# Patient Record
Sex: FEMALE | Race: WHITE
[De-identification: names, ages, dates, MRNs, and addresses within clinical notes are randomized per-mention and may not be internally consistent; named-entity substitution may affect disease eponyms.]

---

## 2017-06-27 ENCOUNTER — APPOINTMENT (OUTPATIENT)
Dept: PEDIATRIC ADOLESCENT MEDICINE | Facility: HOSPITAL | Age: 22
End: 2017-06-27

## 2017-08-04 ENCOUNTER — OUTPATIENT (OUTPATIENT)
Dept: OUTPATIENT SERVICES | Age: 22
LOS: 1 days | End: 2017-08-04

## 2017-08-04 ENCOUNTER — APPOINTMENT (OUTPATIENT)
Dept: PEDIATRIC ADOLESCENT MEDICINE | Facility: CLINIC | Age: 22
End: 2017-08-04
Payer: MEDICAID

## 2017-08-04 VITALS
HEART RATE: 75 BPM | BODY MASS INDEX: 25.87 KG/M2 | HEIGHT: 62.99 IN | DIASTOLIC BLOOD PRESSURE: 65 MMHG | WEIGHT: 146 LBS | TEMPERATURE: 97.5 F | SYSTOLIC BLOOD PRESSURE: 118 MMHG

## 2017-08-04 DIAGNOSIS — J30.1 ALLERGIC RHINITIS DUE TO POLLEN: ICD-10-CM

## 2017-08-04 DIAGNOSIS — Z00.00 ENCOUNTER FOR GENERAL ADULT MEDICAL EXAMINATION W/OUT ABNORMAL FINDINGS: ICD-10-CM

## 2017-08-04 DIAGNOSIS — Z30.011 ENCOUNTER FOR INITIAL PRESCRIPTION OF CONTRACEPTIVE PILLS: ICD-10-CM

## 2017-08-04 DIAGNOSIS — J34.2 DEVIATED NASAL SEPTUM: ICD-10-CM

## 2017-08-04 DIAGNOSIS — N83.201 UNSPECIFIED OVARIAN CYST, RIGHT SIDE: ICD-10-CM

## 2017-08-04 DIAGNOSIS — Z11.3 ENCOUNTER FOR SCREENING FOR INFECTIONS WITH A PREDOMINANTLY SEXUAL MODE OF TRANSMISSION: ICD-10-CM

## 2017-08-04 DIAGNOSIS — G89.29 PAIN IN LEFT KNEE: ICD-10-CM

## 2017-08-04 DIAGNOSIS — N83.202 UNSPECIFIED OVARIAN CYST, RIGHT SIDE: ICD-10-CM

## 2017-08-04 DIAGNOSIS — Z90.49 ACQUIRED ABSENCE OF OTHER SPECIFIED PARTS OF DIGESTIVE TRACT: Chronic | ICD-10-CM

## 2017-08-04 DIAGNOSIS — M25.562 PAIN IN LEFT KNEE: ICD-10-CM

## 2017-08-04 PROCEDURE — 99395 PREV VISIT EST AGE 18-39: CPT

## 2017-08-04 RX ORDER — CHOLECALCIFEROL (VITAMIN D3) 1250 MCG
1.25 MG CAPSULE ORAL
Qty: 4 | Refills: 0 | Status: ACTIVE | COMMUNITY
Start: 2017-07-07

## 2017-08-04 RX ORDER — METHYLPREDNISOLONE 4 MG/1
4 TABLET ORAL
Qty: 21 | Refills: 0 | Status: DISCONTINUED | COMMUNITY
Start: 2017-04-15

## 2017-08-04 RX ORDER — METOCLOPRAMIDE 5 MG/1
5 TABLET ORAL
Qty: 15 | Refills: 0 | Status: DISCONTINUED | COMMUNITY
Start: 2017-03-29

## 2017-08-04 RX ORDER — NORGESTIMATE AND ETHINYL ESTRADIOL 7DAYSX3 LO
0.18/0.215/0.25 KIT ORAL
Qty: 28 | Refills: 0 | Status: DISCONTINUED | COMMUNITY
Start: 2017-06-21

## 2017-08-04 RX ORDER — FLUCONAZOLE 150 MG/1
150 TABLET ORAL
Qty: 1 | Refills: 0 | Status: DISCONTINUED | COMMUNITY
Start: 2017-06-14

## 2017-08-04 RX ORDER — MELOXICAM 15 MG/1
15 TABLET ORAL
Qty: 30 | Refills: 0 | Status: DISCONTINUED | COMMUNITY
Start: 2017-04-13

## 2017-08-04 RX ORDER — AZITHROMYCIN 500 MG/1
500 TABLET, FILM COATED ORAL
Qty: 2 | Refills: 0 | Status: DISCONTINUED | COMMUNITY
Start: 2017-07-07

## 2017-08-04 RX ORDER — CIPROFLOXACIN HYDROCHLORIDE 250 MG/1
250 TABLET, FILM COATED ORAL
Qty: 10 | Refills: 0 | Status: DISCONTINUED | COMMUNITY
Start: 2017-06-21

## 2017-08-08 DIAGNOSIS — G43.009 MIGRAINE WITHOUT AURA, NOT INTRACTABLE, WITHOUT STATUS MIGRAINOSUS: ICD-10-CM

## 2017-08-08 DIAGNOSIS — Z00.00 ENCOUNTER FOR GENERAL ADULT MEDICAL EXAMINATION WITHOUT ABNORMAL FINDINGS: ICD-10-CM

## 2017-08-08 DIAGNOSIS — J30.1 ALLERGIC RHINITIS DUE TO POLLEN: ICD-10-CM

## 2017-08-08 DIAGNOSIS — Z30.011 ENCOUNTER FOR INITIAL PRESCRIPTION OF CONTRACEPTIVE PILLS: ICD-10-CM

## 2017-08-08 DIAGNOSIS — G89.29 OTHER CHRONIC PAIN: ICD-10-CM

## 2017-08-08 DIAGNOSIS — M25.562 PAIN IN LEFT KNEE: ICD-10-CM

## 2017-08-08 DIAGNOSIS — Z11.3 ENCOUNTER FOR SCREENING FOR INFECTIONS WITH A PREDOMINANTLY SEXUAL MODE OF TRANSMISSION: ICD-10-CM

## 2017-08-08 DIAGNOSIS — J34.2 DEVIATED NASAL SEPTUM: ICD-10-CM

## 2017-08-15 LAB
BASOPHILS # BLD AUTO: 0.01 K/UL
BASOPHILS NFR BLD AUTO: 0.1 %
C TRACH RRNA SPEC QL NAA+PROBE: NORMAL
EOSINOPHIL # BLD AUTO: 0.35 K/UL
EOSINOPHIL NFR BLD AUTO: 5 %
HCT VFR BLD CALC: 42.5 %
HGB BLD-MCNC: 14.4 G/DL
IMM GRANULOCYTES NFR BLD AUTO: 0.1 %
IRON SATN MFR SERPL: 14 %
IRON SERPL-MCNC: 39 UG/DL
LYMPHOCYTES # BLD AUTO: 2.24 K/UL
LYMPHOCYTES NFR BLD AUTO: 31.9 %
MAN DIFF?: NORMAL
MCHC RBC-ENTMCNC: 27.1 PG
MCHC RBC-ENTMCNC: 33.9 GM/DL
MCV RBC AUTO: 79.9 FL
MONOCYTES # BLD AUTO: 0.53 K/UL
MONOCYTES NFR BLD AUTO: 7.5 %
N GONORRHOEA RRNA SPEC QL NAA+PROBE: NORMAL
NEUTROPHILS # BLD AUTO: 3.88 K/UL
NEUTROPHILS NFR BLD AUTO: 55.4 %
PLATELET # BLD AUTO: 232 K/UL
RBC # BLD: 5.32 M/UL
RBC # FLD: 12.9 %
SOURCE AMPLIFICATION: NORMAL
TIBC SERPL-MCNC: 287 UG/DL
UIBC SERPL-MCNC: 248 UG/DL
WBC # FLD AUTO: 7.02 K/UL

## 2017-08-24 ENCOUNTER — APPOINTMENT (OUTPATIENT)
Dept: PEDIATRIC ADOLESCENT MEDICINE | Facility: HOSPITAL | Age: 22
End: 2017-08-24

## 2017-12-22 ENCOUNTER — APPOINTMENT (OUTPATIENT)
Dept: PEDIATRIC ADOLESCENT MEDICINE | Facility: HOSPITAL | Age: 22
End: 2017-12-22
Payer: MEDICAID

## 2017-12-22 ENCOUNTER — OUTPATIENT (OUTPATIENT)
Dept: OUTPATIENT SERVICES | Age: 22
LOS: 1 days | End: 2017-12-22

## 2017-12-22 VITALS — WEIGHT: 146 LBS | SYSTOLIC BLOOD PRESSURE: 101 MMHG | HEART RATE: 68 BPM | DIASTOLIC BLOOD PRESSURE: 74 MMHG

## 2017-12-22 DIAGNOSIS — Z90.49 ACQUIRED ABSENCE OF OTHER SPECIFIED PARTS OF DIGESTIVE TRACT: Chronic | ICD-10-CM

## 2017-12-22 DIAGNOSIS — B35.1 TINEA UNGUIUM: ICD-10-CM

## 2017-12-22 DIAGNOSIS — E55.9 VITAMIN D DEFICIENCY, UNSPECIFIED: ICD-10-CM

## 2017-12-22 DIAGNOSIS — M25.512 PAIN IN LEFT SHOULDER: ICD-10-CM

## 2017-12-22 DIAGNOSIS — M25.562 PAIN IN LEFT KNEE: ICD-10-CM

## 2017-12-22 DIAGNOSIS — J45.31 MILD PERSISTENT ASTHMA WITH (ACUTE) EXACERBATION: ICD-10-CM

## 2017-12-22 PROCEDURE — 99214 OFFICE O/P EST MOD 30 MIN: CPT

## 2017-12-22 RX ORDER — AMOXICILLIN 875 MG/1
875 TABLET, FILM COATED ORAL
Qty: 20 | Refills: 0 | Status: DISCONTINUED | COMMUNITY
Start: 2017-10-21

## 2017-12-22 RX ORDER — FLUTICASONE PROPIONATE 50 UG/1
50 SPRAY, METERED NASAL DAILY
Qty: 1 | Refills: 0 | Status: DISCONTINUED | COMMUNITY
Start: 2017-08-04 | End: 2017-12-22

## 2017-12-22 RX ORDER — NORGESTIMATE AND ETHINYL ESTRADIOL 7DAYSX3 28
0.18/0.215/0.25 KIT ORAL
Qty: 1 | Refills: 0 | Status: DISCONTINUED | COMMUNITY
Start: 2017-08-04 | End: 2017-08-22

## 2017-12-22 RX ORDER — AMOXICILLIN AND CLAVULANATE POTASSIUM 875; 125 MG/1; MG/1
875-125 TABLET, COATED ORAL
Qty: 20 | Refills: 0 | Status: COMPLETED | COMMUNITY
Start: 2017-11-22

## 2017-12-28 LAB — 25(OH)D3 SERPL-MCNC: 27.5 NG/ML

## 2017-12-29 ENCOUNTER — APPOINTMENT (OUTPATIENT)
Dept: DERMATOLOGY | Facility: CLINIC | Age: 22
End: 2017-12-29
Payer: MEDICAID

## 2017-12-29 VITALS
DIASTOLIC BLOOD PRESSURE: 76 MMHG | HEIGHT: 63 IN | SYSTOLIC BLOOD PRESSURE: 104 MMHG | BODY MASS INDEX: 25.69 KG/M2 | WEIGHT: 145 LBS

## 2017-12-29 DIAGNOSIS — Z91.89 OTHER SPECIFIED PERSONAL RISK FACTORS, NOT ELSEWHERE CLASSIFIED: ICD-10-CM

## 2017-12-29 DIAGNOSIS — L85.8 OTHER SPECIFIED EPIDERMAL THICKENING: ICD-10-CM

## 2017-12-29 DIAGNOSIS — L60.8 OTHER NAIL DISORDERS: ICD-10-CM

## 2017-12-29 DIAGNOSIS — Z84.0 FAMILY HISTORY OF DISEASES OF THE SKIN AND SUBCUTANEOUS TISSUE: ICD-10-CM

## 2017-12-29 PROCEDURE — 99202 OFFICE O/P NEW SF 15 MIN: CPT

## 2017-12-29 RX ORDER — AMMONIUM LACTATE 12 %
12 CREAM (GRAM) TOPICAL
Qty: 1 | Refills: 11 | Status: ACTIVE | COMMUNITY
Start: 2017-12-29 | End: 1900-01-01

## 2018-01-02 ENCOUNTER — MEDICATION RENEWAL (OUTPATIENT)
Age: 23
End: 2018-01-02

## 2018-01-03 DIAGNOSIS — E55.9 VITAMIN D DEFICIENCY, UNSPECIFIED: ICD-10-CM

## 2018-01-03 DIAGNOSIS — B35.1 TINEA UNGUIUM: ICD-10-CM

## 2018-01-03 DIAGNOSIS — R51 HEADACHE: ICD-10-CM

## 2018-01-03 DIAGNOSIS — J45.31 MILD PERSISTENT ASTHMA WITH (ACUTE) EXACERBATION: ICD-10-CM

## 2018-01-03 DIAGNOSIS — M25.512 PAIN IN LEFT SHOULDER: ICD-10-CM

## 2018-01-03 DIAGNOSIS — M25.562 PAIN IN LEFT KNEE: ICD-10-CM

## 2018-08-01 ENCOUNTER — OUTPATIENT (OUTPATIENT)
Dept: OUTPATIENT SERVICES | Facility: HOSPITAL | Age: 23
LOS: 1 days | End: 2018-08-01
Payer: MEDICAID

## 2018-08-01 DIAGNOSIS — Z90.49 ACQUIRED ABSENCE OF OTHER SPECIFIED PARTS OF DIGESTIVE TRACT: Chronic | ICD-10-CM

## 2018-08-01 PROCEDURE — G9001: CPT

## 2018-08-06 ENCOUNTER — INPATIENT (INPATIENT)
Facility: HOSPITAL | Age: 23
LOS: 1 days | Discharge: HOME | DRG: 72 | End: 2018-08-08
Attending: PSYCHIATRY & NEUROLOGY | Admitting: PSYCHIATRY & NEUROLOGY
Payer: MEDICAID

## 2018-08-06 VITALS
OXYGEN SATURATION: 99 % | SYSTOLIC BLOOD PRESSURE: 132 MMHG | TEMPERATURE: 99 F | DIASTOLIC BLOOD PRESSURE: 96 MMHG | RESPIRATION RATE: 20 BRPM | HEART RATE: 94 BPM

## 2018-08-06 DIAGNOSIS — I63.9 CEREBRAL INFARCTION, UNSPECIFIED: ICD-10-CM

## 2018-08-06 DIAGNOSIS — Z90.49 ACQUIRED ABSENCE OF OTHER SPECIFIED PARTS OF DIGESTIVE TRACT: Chronic | ICD-10-CM

## 2018-08-06 LAB
ALBUMIN SERPL ELPH-MCNC: 4.4 G/DL — SIGNIFICANT CHANGE UP (ref 3.3–5)
ALP SERPL-CCNC: 78 U/L — SIGNIFICANT CHANGE UP (ref 40–120)
ALT FLD-CCNC: 12 U/L — SIGNIFICANT CHANGE UP (ref 10–45)
ANION GAP SERPL CALC-SCNC: 11 MMOL/L — SIGNIFICANT CHANGE UP (ref 5–17)
APTT BLD: 29.3 SEC — SIGNIFICANT CHANGE UP (ref 27.5–37.4)
AST SERPL-CCNC: 17 U/L — SIGNIFICANT CHANGE UP (ref 10–40)
BASOPHILS # BLD AUTO: 0 K/UL — SIGNIFICANT CHANGE UP (ref 0–0.2)
BASOPHILS NFR BLD AUTO: 0.2 % — SIGNIFICANT CHANGE UP (ref 0–2)
BILIRUB SERPL-MCNC: 0.6 MG/DL — SIGNIFICANT CHANGE UP (ref 0.2–1.2)
BUN SERPL-MCNC: 10 MG/DL — SIGNIFICANT CHANGE UP (ref 7–23)
CALCIUM SERPL-MCNC: 8.9 MG/DL — SIGNIFICANT CHANGE UP (ref 8.4–10.5)
CHLORIDE SERPL-SCNC: 106 MMOL/L — SIGNIFICANT CHANGE UP (ref 96–108)
CO2 SERPL-SCNC: 24 MMOL/L — SIGNIFICANT CHANGE UP (ref 22–31)
CREAT SERPL-MCNC: 1.01 MG/DL — SIGNIFICANT CHANGE UP (ref 0.5–1.3)
EOSINOPHIL # BLD AUTO: 0.3 K/UL — SIGNIFICANT CHANGE UP (ref 0–0.5)
EOSINOPHIL NFR BLD AUTO: 3.5 % — SIGNIFICANT CHANGE UP (ref 0–6)
GLUCOSE SERPL-MCNC: 128 MG/DL — HIGH (ref 70–99)
HCT VFR BLD CALC: 42 % — SIGNIFICANT CHANGE UP (ref 34.5–45)
HGB BLD-MCNC: 14.3 G/DL — SIGNIFICANT CHANGE UP (ref 11.5–15.5)
INR BLD: 1.04 RATIO — SIGNIFICANT CHANGE UP (ref 0.88–1.16)
LYMPHOCYTES # BLD AUTO: 3.2 K/UL — SIGNIFICANT CHANGE UP (ref 1–3.3)
LYMPHOCYTES # BLD AUTO: 38.5 % — SIGNIFICANT CHANGE UP (ref 13–44)
MCHC RBC-ENTMCNC: 26.7 PG — LOW (ref 27–34)
MCHC RBC-ENTMCNC: 34 GM/DL — SIGNIFICANT CHANGE UP (ref 32–36)
MCV RBC AUTO: 78.3 FL — LOW (ref 80–100)
MONOCYTES # BLD AUTO: 0.5 K/UL — SIGNIFICANT CHANGE UP (ref 0–0.9)
MONOCYTES NFR BLD AUTO: 5.6 % — SIGNIFICANT CHANGE UP (ref 2–14)
NEUTROPHILS # BLD AUTO: 4.3 K/UL — SIGNIFICANT CHANGE UP (ref 1.8–7.4)
NEUTROPHILS NFR BLD AUTO: 52.1 % — SIGNIFICANT CHANGE UP (ref 43–77)
PLATELET # BLD AUTO: 256 K/UL — SIGNIFICANT CHANGE UP (ref 150–400)
POTASSIUM SERPL-MCNC: 3.5 MMOL/L — SIGNIFICANT CHANGE UP (ref 3.5–5.3)
POTASSIUM SERPL-SCNC: 3.5 MMOL/L — SIGNIFICANT CHANGE UP (ref 3.5–5.3)
PROT SERPL-MCNC: 7.1 G/DL — SIGNIFICANT CHANGE UP (ref 6–8.3)
PROTHROM AB SERPL-ACNC: 11.3 SEC — SIGNIFICANT CHANGE UP (ref 9.8–12.7)
RBC # BLD: 5.36 M/UL — HIGH (ref 3.8–5.2)
RBC # FLD: 13 % — SIGNIFICANT CHANGE UP (ref 10.3–14.5)
SODIUM SERPL-SCNC: 141 MMOL/L — SIGNIFICANT CHANGE UP (ref 135–145)
TROPONIN T, HIGH SENSITIVITY RESULT: <6 NG/L — SIGNIFICANT CHANGE UP (ref 0–51)
WBC # BLD: 8.3 K/UL — SIGNIFICANT CHANGE UP (ref 3.8–10.5)
WBC # FLD AUTO: 8.3 K/UL — SIGNIFICANT CHANGE UP (ref 3.8–10.5)

## 2018-08-06 PROCEDURE — 99291 CRITICAL CARE FIRST HOUR: CPT

## 2018-08-06 PROCEDURE — 70498 CT ANGIOGRAPHY NECK: CPT | Mod: 26

## 2018-08-06 PROCEDURE — 70450 CT HEAD/BRAIN W/O DYE: CPT | Mod: 26,77

## 2018-08-06 PROCEDURE — 93010 ELECTROCARDIOGRAM REPORT: CPT

## 2018-08-06 PROCEDURE — 70496 CT ANGIOGRAPHY HEAD: CPT | Mod: 26

## 2018-08-06 PROCEDURE — 70450 CT HEAD/BRAIN W/O DYE: CPT | Mod: 26,59

## 2018-08-06 RX ORDER — ALTEPLASE 100 MG
5 KIT INTRAVENOUS ONCE
Qty: 0 | Refills: 0 | Status: COMPLETED | OUTPATIENT
Start: 2018-08-06 | End: 2018-08-06

## 2018-08-06 RX ORDER — ALBUTEROL 90 UG/1
2 AEROSOL, METERED ORAL EVERY 6 HOURS
Qty: 0 | Refills: 0 | Status: DISCONTINUED | OUTPATIENT
Start: 2018-08-06 | End: 2018-08-08

## 2018-08-06 RX ORDER — METOCLOPRAMIDE HCL 10 MG
10 TABLET ORAL ONCE
Qty: 0 | Refills: 0 | Status: COMPLETED | OUTPATIENT
Start: 2018-08-06 | End: 2018-08-06

## 2018-08-06 RX ORDER — MAGNESIUM SULFATE 500 MG/ML
2 VIAL (ML) INJECTION ONCE
Qty: 0 | Refills: 0 | Status: COMPLETED | OUTPATIENT
Start: 2018-08-06 | End: 2018-08-06

## 2018-08-06 RX ORDER — ALTEPLASE 100 MG
46 KIT INTRAVENOUS ONCE
Qty: 0 | Refills: 0 | Status: COMPLETED | OUTPATIENT
Start: 2018-08-06 | End: 2018-08-06

## 2018-08-06 RX ORDER — ATORVASTATIN CALCIUM 80 MG/1
80 TABLET, FILM COATED ORAL AT BEDTIME
Qty: 0 | Refills: 0 | Status: DISCONTINUED | OUTPATIENT
Start: 2018-08-06 | End: 2018-08-07

## 2018-08-06 RX ORDER — ACETAMINOPHEN 500 MG
1000 TABLET ORAL ONCE
Qty: 0 | Refills: 0 | Status: COMPLETED | OUTPATIENT
Start: 2018-08-06 | End: 2018-08-07

## 2018-08-06 RX ADMIN — ALTEPLASE 300 MILLIGRAM(S): KIT at 22:19

## 2018-08-06 RX ADMIN — ALTEPLASE 300 MILLIGRAM(S): KIT at 22:20

## 2018-08-06 RX ADMIN — ALTEPLASE 46 MILLIGRAM(S): KIT at 22:25

## 2018-08-06 NOTE — ED ADULT NURSE NOTE - INTERVENTIONS DEFINITIONS
Physically safe environment: no spills, clutter or unnecessary equipment/Stretcher in lowest position, wheels locked, appropriate side rails in place/Monitor gait and stability/Call bell, personal items and telephone within reach/[place] to call system/Non-slip footwear when patient is off stretcher

## 2018-08-06 NOTE — ED ADULT NURSE NOTE - OBJECTIVE STATEMENT
23 yo F pmh of asthma and frequent concussions came to ED c/o slurred speech and left sided numbness and weakness starting at 20:30 this past evening.  Pt arrived to ED at 21:26, CODE STROKE initiated at 21:26.  Pt IV given and blood drawn.  Pt started getting changes in speech pattern at 20:30 with worsening left sided weakness and numbness progressing since then.  Denies chest pain, back pain, SOB, fevers/chills, n/v/d, lightheadedness, dizziness, changes in urinary or bowel habits.  A&Ox4, neuro changes, slurred speech noted, left sided numbness and weakness noted with changes to gait, and unsteady balance. +peripheral pulses.  Skin w/d/i.  Safety and comfort maintained.  Will continue to monitor.

## 2018-08-06 NOTE — ED PROVIDER NOTE - PROGRESS NOTE DETAILS
Patient now complaining of nausea and eye floaters, headache, likely complicated migraine. Will try migraine cocktail, reassess. Sonya Parnell DO After conversation with stroke attending, decision made to give TPA. Consent obtained from family and her PCP by neurology. Pt agrees to TPA. Attending Adam Vidal DO

## 2018-08-06 NOTE — ED PROVIDER NOTE - NS ED ATTENDING STATEMENT MOD
Dose change on Smz/Tmp to 1 tablet every other day.       I have personally seen and examined this patient.  I have fully participated in the care of this patient. I have reviewed all pertinent clinical information, including history, physical exam, plan and the Resident’s note and agree except as noted.

## 2018-08-06 NOTE — ED ADULT NURSE REASSESSMENT NOTE - NS ED NURSE REASSESS COMMENT FT1
Neuro resident wanted TPA started prior to getting Type and screens.  Could not obtain,. Neuro resident Ivett  wanted TPA started prior to getting Type and screens.  Could not obtain.

## 2018-08-06 NOTE — ED PROVIDER NOTE - ATTENDING CONTRIBUTION TO CARE
23 yo female previously healthy presents with left sided facial numbness and left sided weakness x 15 min PTA. CODE STroke called upon arrival. Pt denies headache but endorses floaters and photophobia. No hx of migraines. No trauma. No prior similar symptoms in the past. PE: extremely anxious, left sided facial droop with left sided weakness. A/p: Concern for stroke vs atypical migraine. CODE stroke called. Will obtain labs, CT/CTA, appreciate neuro recs, dispo and plan per neuro.

## 2018-08-06 NOTE — H&P ADULT - HISTORY OF PRESENT ILLNESS
Patient is a 22 year old right handed  female presenting to the ED with left sided weakness. Patient has no PMH. States last known normal was 8:55pm. Around 9pm after she took a shower, she noticed that her left face was numb, which over 30 minutes, started to involve her left arm and leg. Patient reports feeling weak in her arm and leg as well. States she has difficulty comprehending what I am saying to her. Also reports nausea, photophobia, seeing spots. States numbness is getting worse while in the ED.

## 2018-08-06 NOTE — ED PROVIDER NOTE - MEDICAL DECISION MAKING DETAILS
21yo female PMh concussion, asthma, scoliosis presenting with left-sided numbness and weakness, no with headache-like symptoms, code stroke called, however patient likely with complicated migraine. Will obtain labs, CTh, neuro following, migraine cocktail, reassess. Sonya Parnell DO

## 2018-08-06 NOTE — H&P ADULT - ASSESSMENT
22 year old right handed  female presenting to the ED with left sided weakness. Patient has no PMH. States last known normal was 8:55pm. Around 9pm after she took a shower, she noticed that her left face was numb, which over 30 minutes, started to involve her left arm and leg. Patient reports feeling weak in her arm and leg as well. States she has difficulty comprehending what I am saying to her. Also reports nausea, photophobia, seeing spots. States numbness is getting worse while in the ED.  On neuro exam, patient with decreased sensation on left. Weakness in left hand , left hip flexion, left foot dorsiflexion/plantarflexion. Drift in left upper and lower extremity  CTH negative. CTA negative.  NIHSS 4, preMRS 0  tPA given at 22:19    Symptoms may be secondary to acute right hemispheric infarct    Plan:  Permissive HTN for 24 hours up to 185/105  Repeat CT head in 24 hours  If repeat CT head without hemorrhagic conversion, start on heparin/lovenox for DVT prophylaxis and ASA 81 mg QD  MRI brain without contrast  TTE with bubble study for possible valvular heart disease  Lipitor 80 mg PO QHS  HbA1c, LDL and continue with aggressive vascular risk factors modifications   Tele monitor  PT/OT eval

## 2018-08-06 NOTE — ED PROVIDER NOTE - PHYSICAL EXAMINATION
Gen: tearful, anxious  Head: NCAT  HEENT: oral mucosa moist, no tongue deviation  Lung: CTAB, no respiratory distress, no wheezing, rales, rhonchi  CV: normal s1/s2, rrr, no murmurs, Normal perfusion, pulses 2+ throughout  Abd: soft, NTND  MSK: No edema, no visible deformities, full range of motion in all 4 extremities  Neuro: CN II-XII grossly intact, strength 4/5 LUE and LLE, no pronator drift, no facial droop  Skin: No rash   Psych: anxious

## 2018-08-06 NOTE — H&P ADULT - NSHPLABSRESULTS_GEN_ALL_CORE
14.3   8.3   )-----------( 256      ( 06 Aug 2018 21:43 )             42.0     08-06    141  |  106  |  10  ----------------------------<  128<H>  3.5   |  24  |  1.01    Ca    8.9      06 Aug 2018 21:43    TPro  7.1  /  Alb  4.4  /  TBili  0.6  /  DBili  x   /  AST  17  /  ALT  12  /  AlkPhos  78  08-06    CAPILLARY BLOOD GLUCOSE  108 (06 Aug 2018 22:44)    POCT Blood Glucose.: 105 mg/dL (06 Aug 2018 21:52)    PT/INR - ( 06 Aug 2018 21:43 )   PT: 11.3 sec;   INR: 1.04 ratio       PTT - ( 06 Aug 2018 21:43 )  PTT:29.3 sec    Vital Signs Last 24 Hrs  T(C): 37.1 (06 Aug 2018 21:15), Max: 37.1 (06 Aug 2018 21:15)  T(F): 98.8 (06 Aug 2018 21:15), Max: 98.8 (06 Aug 2018 21:15)  HR: 94 (06 Aug 2018 21:15) (94 - 94)  BP: 132/96 (06 Aug 2018 21:15) (132/96 - 132/96)  RR: 20 (06 Aug 2018 21:15) (20 - 20)  SpO2: 99% (06 Aug 2018 21:15) (99% - 99%)

## 2018-08-06 NOTE — ED ADULT NURSE NOTE - FACIAL SYMMETRY
Blood sugar and 90 day average sugar reviewed  Results for orders placed or performed in visit on 04/21/17   POC Glycosylated Hemoglobin (Hb A1C)   Result Value Ref Range    Hemoglobin A1C 9.1 %   POC Glucose Fingerstick   Result Value Ref Range    Glucose 286 (A) 70 - 130 mg/dL     Average sugar is higher 210 or so  She is not up to date with preventive care- is living in nursing home setting  Goal a1c 7-8 %   Based on higher pp dinner sugars, would increase predinner humalog to 22 units  email sugars weekly   Continue testing ac and hs   left facial droop

## 2018-08-06 NOTE — H&P ADULT - ATTENDING COMMENTS
I have seen and examined this patient with the stroke neurology team.     History was reviewed with the patient and/or available family members.   ROS: All negative except documented in the HPI.   Neurological exam was performed and agree with exam as documented above.   Laboratory results and imaging studies were reviewed by me.   I agree with the neurology resident note as documented above.    22 years old woman with no known vascular risk factors is evaluated at [facility] for acute onset left-sided weakness. She reports to be last normal at around 8:30-8:45 PM, when she noticed the acute onset of numbness over the left face. Soon after that she noticed tingling/numbness over the left arm and leg as well as left-sided weakness. She presented to [facility] for further evaluation. Neurological examination today shows left hemiparesis (LUE with pronator drift and LLE minimal movement against gravity), decreased light touch over the right face, arm and leg as well as mild to moderate dysarthria. CT brain on admission did not show any evidence of acute infarct or hemorrhage. CTA head and neck did not show any symptomatic intracranial or extracranial cerebral large vessel severe stenosis or occlusion.    Impression:  Cerebral thrombosis/embolism with cerebral infarction. Right OKSANA/MCA distribution stroke - the exact etiology of her stroke remains unclear and requires further evaluation    Plan:   - Risks, benefits and adverse reactions associated with IV tPA including hemorrhagic stroke were discussed with patient and available family members in detail. After ruling out all the contraindications and obtaining verbal consent, patient would be treated with IV tPA  - Continue with  24 hours post IV tPA precautions including BP goal<185/110 mmHg before the tPA bolus administration and <180/105 mmHg after tPA bolus for first 24 hours followed by gradual normotension as per protocol  - Not a candidate for neurosurgical intervention considering absence of proximal intracranial large vessel occlusion on CTA   - MRI brain without contrast   - Aspirin and pharmacological DVT prophylaxis to be considered at 24 hours after the IV tPA based on repeating brain imaging, SCDs for DVT prophylaxis in the interim  - Atorvastatin 80 mg at bedtime after establishing enteral access or passing swallow evaluation; further dose titration as per LDL results  - HbA1C and LDL, continue with aggressive vascular risk factors modifications   - CARLOS with bubble study and telemetry to screen for possible cardiac source of embolism  - Prolonged cardiac monitoring with ICM to screen for occult cardiac arrhythmias like atrial fibrillation being the cardiac source of embolism to be considered based on results of above mentioned cardiac tests  - Hypercoagulable workup  - PT/OT/Speech and swallow evaluation   - Stroke education     Above mentioned plan was discussed with patient and available family member in detail. All the questions were answered and concerns were addressed. More than 84 minutes were spent in evaluation and management of this critically ill and unstable patient with an acute stroke.

## 2018-08-06 NOTE — ED ADULT TRIAGE NOTE - CHIEF COMPLAINT QUOTE
pt states she got out of the shower about an hour ago and then after she could not feel the left side of her face, left arm. pt reports decreased sensation compared to right side  unable to open mouth fully in triage, unable to lift arms, left side of face is "heavy"   pt crying in triage

## 2018-08-06 NOTE — ED ADULT NURSE NOTE - CHPI ED NUR SYMPTOMS NEG
no loss of consciousness/no blurred vision/no confusion/no weakness/no nausea/no vomiting/no numbness/no fever/no change in level of consciousness/no dizziness

## 2018-08-07 ENCOUNTER — TRANSCRIPTION ENCOUNTER (OUTPATIENT)
Age: 23
End: 2018-08-07

## 2018-08-07 LAB
ALBUMIN SERPL ELPH-MCNC: 3.6 G/DL — SIGNIFICANT CHANGE UP (ref 3.3–5)
ALP SERPL-CCNC: 65 U/L — SIGNIFICANT CHANGE UP (ref 40–120)
ALT FLD-CCNC: 11 U/L — SIGNIFICANT CHANGE UP (ref 10–45)
ANION GAP SERPL CALC-SCNC: 11 MMOL/L — SIGNIFICANT CHANGE UP (ref 5–17)
APPEARANCE UR: CLEAR — SIGNIFICANT CHANGE UP
AST SERPL-CCNC: 13 U/L — SIGNIFICANT CHANGE UP (ref 10–40)
BACTERIA # UR AUTO: NEGATIVE — SIGNIFICANT CHANGE UP
BASOPHILS # BLD AUTO: 0.02 K/UL — SIGNIFICANT CHANGE UP (ref 0–0.2)
BASOPHILS NFR BLD AUTO: 0.3 % — SIGNIFICANT CHANGE UP (ref 0–2)
BILIRUB SERPL-MCNC: 0.6 MG/DL — SIGNIFICANT CHANGE UP (ref 0.2–1.2)
BILIRUB UR-MCNC: NEGATIVE — SIGNIFICANT CHANGE UP
BUN SERPL-MCNC: 10 MG/DL — SIGNIFICANT CHANGE UP (ref 7–23)
CALCIUM SERPL-MCNC: 8.4 MG/DL — SIGNIFICANT CHANGE UP (ref 8.4–10.5)
CHLORIDE SERPL-SCNC: 108 MMOL/L — SIGNIFICANT CHANGE UP (ref 96–108)
CHOLEST SERPL-MCNC: 104 MG/DL — SIGNIFICANT CHANGE UP (ref 10–199)
CO2 SERPL-SCNC: 23 MMOL/L — SIGNIFICANT CHANGE UP (ref 22–31)
COLOR SPEC: YELLOW — SIGNIFICANT CHANGE UP
CREAT SERPL-MCNC: 0.8 MG/DL — SIGNIFICANT CHANGE UP (ref 0.5–1.3)
DIFF PNL FLD: ABNORMAL
EOSINOPHIL # BLD AUTO: 0.25 K/UL — SIGNIFICANT CHANGE UP (ref 0–0.5)
EOSINOPHIL NFR BLD AUTO: 3.3 % — SIGNIFICANT CHANGE UP (ref 0–6)
EPI CELLS # UR: 3 /HPF — SIGNIFICANT CHANGE UP (ref 0–5)
GLUCOSE BLDC GLUCOMTR-MCNC: 101 MG/DL — HIGH (ref 70–99)
GLUCOSE SERPL-MCNC: 93 MG/DL — SIGNIFICANT CHANGE UP (ref 70–99)
GLUCOSE UR QL: NEGATIVE MG/DL — SIGNIFICANT CHANGE UP
HBA1C BLD-MCNC: 5.2 % — SIGNIFICANT CHANGE UP (ref 4–5.6)
HCG SERPL-ACNC: <2 MIU/ML — SIGNIFICANT CHANGE UP
HCT VFR BLD CALC: 38.7 % — SIGNIFICANT CHANGE UP (ref 34.5–45)
HDLC SERPL-MCNC: 50 MG/DL — SIGNIFICANT CHANGE UP (ref 40–125)
HGB BLD-MCNC: 13 G/DL — SIGNIFICANT CHANGE UP (ref 11.5–15.5)
HYALINE CASTS # UR AUTO: 1 /LPF — SIGNIFICANT CHANGE UP (ref 0–7)
IMM GRANULOCYTES NFR BLD AUTO: 0.1 % — SIGNIFICANT CHANGE UP (ref 0–1.5)
KETONES UR-MCNC: NEGATIVE — SIGNIFICANT CHANGE UP
LEUKOCYTE ESTERASE UR-ACNC: NEGATIVE — SIGNIFICANT CHANGE UP
LIPID PNL WITH DIRECT LDL SERPL: 45 MG/DL — SIGNIFICANT CHANGE UP
LYMPHOCYTES # BLD AUTO: 2.87 K/UL — SIGNIFICANT CHANGE UP (ref 1–3.3)
LYMPHOCYTES # BLD AUTO: 37.4 % — SIGNIFICANT CHANGE UP (ref 13–44)
MCHC RBC-ENTMCNC: 26.4 PG — LOW (ref 27–34)
MCHC RBC-ENTMCNC: 33.6 GM/DL — SIGNIFICANT CHANGE UP (ref 32–36)
MCV RBC AUTO: 78.5 FL — LOW (ref 80–100)
MONOCYTES # BLD AUTO: 0.56 K/UL — SIGNIFICANT CHANGE UP (ref 0–0.9)
MONOCYTES NFR BLD AUTO: 7.3 % — SIGNIFICANT CHANGE UP (ref 2–14)
NEUTROPHILS # BLD AUTO: 3.96 K/UL — SIGNIFICANT CHANGE UP (ref 1.8–7.4)
NEUTROPHILS NFR BLD AUTO: 51.6 % — SIGNIFICANT CHANGE UP (ref 43–77)
NITRITE UR-MCNC: NEGATIVE — SIGNIFICANT CHANGE UP
PH UR: 6.5 — SIGNIFICANT CHANGE UP (ref 5–8)
PLATELET # BLD AUTO: 218 K/UL — SIGNIFICANT CHANGE UP (ref 150–400)
POTASSIUM SERPL-MCNC: 3.6 MMOL/L — SIGNIFICANT CHANGE UP (ref 3.5–5.3)
POTASSIUM SERPL-SCNC: 3.6 MMOL/L — SIGNIFICANT CHANGE UP (ref 3.5–5.3)
PROT SERPL-MCNC: 6.2 G/DL — SIGNIFICANT CHANGE UP (ref 6–8.3)
PROT UR-MCNC: 30 MG/DL
RBC # BLD: 4.93 M/UL — SIGNIFICANT CHANGE UP (ref 3.8–5.2)
RBC # FLD: 13.3 % — SIGNIFICANT CHANGE UP (ref 10.3–14.5)
RBC CASTS # UR COMP ASSIST: 3 /HPF — SIGNIFICANT CHANGE UP (ref 0–4)
SODIUM SERPL-SCNC: 142 MMOL/L — SIGNIFICANT CHANGE UP (ref 135–145)
SP GR SPEC: 1.04 — HIGH (ref 1.01–1.02)
TOTAL CHOLESTEROL/HDL RATIO MEASUREMENT: 2.1 RATIO — LOW (ref 3.3–7.1)
TRIGL SERPL-MCNC: 47 MG/DL — SIGNIFICANT CHANGE UP (ref 10–149)
TROPONIN T, HIGH SENSITIVITY RESULT: <6 NG/L — SIGNIFICANT CHANGE UP (ref 0–51)
UROBILINOGEN FLD QL: NEGATIVE MG/DL — SIGNIFICANT CHANGE UP
WBC # BLD: 7.67 K/UL — SIGNIFICANT CHANGE UP (ref 3.8–10.5)
WBC # FLD AUTO: 7.67 K/UL — SIGNIFICANT CHANGE UP (ref 3.8–10.5)
WBC UR QL: 4 /HPF — SIGNIFICANT CHANGE UP (ref 0–5)

## 2018-08-07 PROCEDURE — 93010 ELECTROCARDIOGRAM REPORT: CPT

## 2018-08-07 PROCEDURE — 70553 MRI BRAIN STEM W/O & W/DYE: CPT | Mod: 26

## 2018-08-07 PROCEDURE — 72156 MRI NECK SPINE W/O & W/DYE: CPT | Mod: 26

## 2018-08-07 PROCEDURE — 74230 X-RAY XM SWLNG FUNCJ C+: CPT | Mod: 26

## 2018-08-07 PROCEDURE — 99233 SBSQ HOSP IP/OBS HIGH 50: CPT

## 2018-08-07 RX ORDER — SODIUM CHLORIDE 9 MG/ML
1000 INJECTION INTRAMUSCULAR; INTRAVENOUS; SUBCUTANEOUS
Qty: 0 | Refills: 0 | Status: DISCONTINUED | OUTPATIENT
Start: 2018-08-07 | End: 2018-08-08

## 2018-08-07 RX ORDER — METOCLOPRAMIDE HCL 10 MG
5 TABLET ORAL ONCE
Qty: 0 | Refills: 0 | Status: COMPLETED | OUTPATIENT
Start: 2018-08-07 | End: 2018-08-07

## 2018-08-07 RX ORDER — ACETAMINOPHEN 500 MG
650 TABLET ORAL EVERY 6 HOURS
Qty: 0 | Refills: 0 | Status: DISCONTINUED | OUTPATIENT
Start: 2018-08-07 | End: 2018-08-08

## 2018-08-07 RX ORDER — METOCLOPRAMIDE HCL 10 MG
10 TABLET ORAL ONCE
Qty: 0 | Refills: 0 | Status: COMPLETED | OUTPATIENT
Start: 2018-08-07 | End: 2018-08-07

## 2018-08-07 RX ADMIN — Medication 400 MILLIGRAM(S): at 00:36

## 2018-08-07 RX ADMIN — Medication 10 MILLIGRAM(S): at 00:36

## 2018-08-07 RX ADMIN — Medication 5 MILLIGRAM(S): at 09:54

## 2018-08-07 RX ADMIN — Medication 1000 MILLIGRAM(S): at 01:36

## 2018-08-07 RX ADMIN — Medication 650 MILLIGRAM(S): at 21:30

## 2018-08-07 RX ADMIN — Medication 10 MILLIGRAM(S): at 19:10

## 2018-08-07 RX ADMIN — SODIUM CHLORIDE 125 MILLILITER(S): 9 INJECTION INTRAMUSCULAR; INTRAVENOUS; SUBCUTANEOUS at 03:41

## 2018-08-07 RX ADMIN — Medication 650 MILLIGRAM(S): at 21:05

## 2018-08-07 NOTE — SWALLOW VFSS/MBS ASSESSMENT ADULT - NS SWALLOW VFSS REC ASPIR MON
cough/fever/Monitor for s/s aspiration/laryngeal penetration. If noted:  D/C p.o. intake, provide non-oral nutrition/hydration/meds, and contact this service @ [phone]/pneumonia/throat clearing/change of breathing pattern/gurgly voice

## 2018-08-07 NOTE — DISCHARGE NOTE ADULT - MEDICATION SUMMARY - MEDICATIONS TO STOP TAKING
I will STOP taking the medications listed below when I get home from the hospital:    predniSONE 20 mg oral tablet  -- 1 tab(s) by mouth once a day  -- It is very important that you take or use this exactly as directed.  Do not skip doses or discontinue unless directed by your doctor.  Obtain medical advice before taking any non-prescription drugs as some may affect the action of this medication.  Take with food or milk.

## 2018-08-07 NOTE — PROGRESS NOTE ADULT - SUBJECTIVE AND OBJECTIVE BOX
THE PATIENT WAS SEEN AND EXAMINED BY ME WITH THE HOUSESTAFF AND STROKE TEAM DURING MORNING ROUNDS.   HPI:  Patient is a 22 year old right handed  female presenting to the ED with left sided weakness. Patient has no PMH. States last known normal was 8:55pm. Around 9pm after she took a shower, she noticed that her left face was numb, which over 30 minutes, started to involve her left arm and leg. Patient reports feeling weak in her arm and leg as well. States she has difficulty comprehending what I am saying to her. Also reports nausea, photophobia, seeing spots. States numbness is getting worse while in the ED. (06 Aug 2018 23:02)    SUBJECTIVE: No events overnight.  No new neurologic complaints.      ALBUTerol    90 MICROgram(s) HFA Inhaler 2 Puff(s) Inhalation every 6 hours PRN  sodium chloride 0.9%. 1000 milliLiter(s) IV Continuous <Continuous>      PHYSICAL EXAM:   Vital Signs Last 24 Hrs  T(C): 36.9 (07 Aug 2018 08:00), Max: 37.1 (06 Aug 2018 21:15)  T(F): 98.5 (07 Aug 2018 08:00), Max: 98.8 (06 Aug 2018 21:15)  HR: 64 (07 Aug 2018 12:00) (54 - 96)  BP: 103/65 (07 Aug 2018 12:00) (92/63 - 132/96)  BP(mean): 74 (07 Aug 2018 12:00) (7 - 79)  RR: 15 (07 Aug 2018 12:00) (11 - 20)  SpO2: 98% (07 Aug 2018 12:00) (97% - 100%)    General: No acute distress  HEENT: EOM intact, visual fields full  Abdomen: Soft, nontender, nondistended   Extremities: No edema    NEUROLOGICAL EXAM:  Mental status: Awake, alert, oriented x3, no aphasia, no neglect, normal memory   Cranial Nerves: No facial asymmetry, no nystagmus, no dysarthria,  tongue midline  Motor exam: Normal tone, no drift, 5/5 RUE, 5/5 RLE, 5/5 LUE, 5/5 LLE, normal fine finger movements.  Sensation: Intact to light touch   Coordination/ Gait: No dysmetria    LABS:                        13.0   7.67  )-----------( 218      ( 07 Aug 2018 08:03 )             38.7    08-07    142  |  108  |  10  ----------------------------<  93  3.6   |  23  |  0.80    Ca    8.4      07 Aug 2018 06:22    TPro  6.2  /  Alb  3.6  /  TBili  0.6  /  DBili  x   /  AST  13  /  ALT  11  /  AlkPhos  65  08-07  PT/INR - ( 06 Aug 2018 21:43 )   PT: 11.3 sec;   INR: 1.04 ratio         PTT - ( 06 Aug 2018 21:43 )  PTT:29.3 sec  Hemoglobin A1C, Whole Blood: 5.2 % (08-07 @ 08:03)      IMAGING: Reviewed by me.   CT Head No Cont (08.06.18)   No acute intracranial hemorrhage, mass effect, vasogenic edema, or   evidence of acute territorial infarct.    (08.06.18)   CTA BRAIN: Patent intracranial circulation. No flow-limiting stenosis,   occlusion, or aneurysm.    CTA NECK: Patent cervical vasculature. No flow limiting stenosis,   occlusion, or dissection. THE PATIENT WAS SEEN AND EXAMINED BY ME WITH THE HOUSESTAFF AND STROKE TEAM DURING MORNING ROUNDS.   HPI:  Patient is a 22 year old right handed  female presenting to the ED with left sided weakness. Patient has no PMH. States last known normal was 8:55pm. Around 9pm after she took a shower, she noticed that her left face was numb, which over 30 minutes, started to involve her left arm and leg. Patient reports feeling weak in her arm and leg as well. States she has difficulty comprehending what I am saying to her. Also reports nausea, photophobia, seeing spots. States numbness is getting worse while in the ED. (06 Aug 2018 23:02)    SUBJECTIVE: No events overnight. Reports new numbness to right leg.      ALBUTerol    90 MICROgram(s) HFA Inhaler 2 Puff(s) Inhalation every 6 hours PRN  sodium chloride 0.9%. 1000 milliLiter(s) IV Continuous <Continuous>      PHYSICAL EXAM:   Vital Signs Last 24 Hrs  T(C): 36.9 (07 Aug 2018 08:00), Max: 37.1 (06 Aug 2018 21:15)  T(F): 98.5 (07 Aug 2018 08:00), Max: 98.8 (06 Aug 2018 21:15)  HR: 64 (07 Aug 2018 12:00) (54 - 96)  BP: 103/65 (07 Aug 2018 12:00) (92/63 - 132/96)  BP(mean): 74 (07 Aug 2018 12:00) (7 - 79)  RR: 15 (07 Aug 2018 12:00) (11 - 20)  SpO2: 98% (07 Aug 2018 12:00) (97% - 100%)    General: No acute distress  HEENT: EOM intact, visual fields full  Abdomen: Soft, nontender, nondistended   Extremities: No edema    NEUROLOGICAL EXAM:  Mental status: Awake, alert, oriented x3, no aphasia, no neglect, normal memory, IDs objects   Cranial Nerves: slight left facial droop, no nystagmus, no dysarthria,  tongue midline  Motor exam: Normal tone, left drift, 5/5 RUE, 5/5 RLE, 4/5 left hand, 5-5 left elbow, 5/5 LLE, normal fine finger movements.  Sensation: Intact to light touch   Coordination/ Gait: No dysmetria    LABS:                        13.0   7.67  )-----------( 218      ( 07 Aug 2018 08:03 )             38.7    08-07    142  |  108  |  10  ----------------------------<  93  3.6   |  23  |  0.80    Ca    8.4      07 Aug 2018 06:22    TPro  6.2  /  Alb  3.6  /  TBili  0.6  /  DBili  x   /  AST  13  /  ALT  11  /  AlkPhos  65  08-07  PT/INR - ( 06 Aug 2018 21:43 )   PT: 11.3 sec;   INR: 1.04 ratio         PTT - ( 06 Aug 2018 21:43 )  PTT:29.3 sec  Hemoglobin A1C, Whole Blood: 5.2 % (08-07 @ 08:03)      IMAGING: Reviewed by me.   CT Head No Cont (08.06.18)   No acute intracranial hemorrhage, mass effect, vasogenic edema, or   evidence of acute territorial infarct.    (08.06.18)   CTA BRAIN: Patent intracranial circulation. No flow-limiting stenosis,   occlusion, or aneurysm.    CTA NECK: Patent cervical vasculature. No flow limiting stenosis,   occlusion, or dissection. THE PATIENT WAS SEEN AND EXAMINED BY ME WITH THE HOUSESTAFF AND STROKE TEAM DURING MORNING ROUNDS.     HPI:  Patient is a 22 year old right handed  female presenting to the ED with left sided weakness. Patient has no PMH. States last known normal was 8:55pm. Around 9pm after she took a shower, she noticed that her left face was numb, which over 30 minutes, started to involve her left arm and leg. Patient reports feeling weak in her arm and leg as well. States she has difficulty comprehending what I am saying to her. Also reports nausea, photophobia, seeing spots. States numbness is getting worse while in the ED. (06 Aug 2018 23:02)    SUBJECTIVE: No events overnight. Reports new numbness to right leg since this morning.      ROS: All negative except documented above.    ALBUTerol    90 MICROgram(s) HFA Inhaler 2 Puff(s) Inhalation every 6 hours PRN  sodium chloride 0.9%. 1000 milliLiter(s) IV Continuous <Continuous>    PHYSICAL EXAM:   Vital Signs Last 24 Hrs  T(C): 36.9 (07 Aug 2018 08:00), Max: 37.1 (06 Aug 2018 21:15)  T(F): 98.5 (07 Aug 2018 08:00), Max: 98.8 (06 Aug 2018 21:15)  HR: 64 (07 Aug 2018 12:00) (54 - 96)  BP: 103/65 (07 Aug 2018 12:00) (92/63 - 132/96)  BP(mean): 74 (07 Aug 2018 12:00) (7 - 79)  RR: 15 (07 Aug 2018 12:00) (11 - 20)  SpO2: 98% (07 Aug 2018 12:00) (97% - 100%)    General: No acute distress  HEENT: EOM intact, visual fields full  Abdomen: Soft, nontender, nondistended   Extremities: No edema    NEUROLOGICAL EXAM:  Mental status: Awake, alert, oriented x3, no aphasia, no neglect, normal memory, IDs objects   Cranial Nerves: slight left facial droop, no nystagmus, no dysarthria,  tongue midline  Motor exam: Normal tone, LUE drift, RUE 5/5, RLE 3/5 - effort related weakness likely secondary to pain, LUE with motor drift and LLE with some movement in the bed but not against gravity  Sensation: decreased to light touch over the left face, arm and leg    Coordination/ Gait: No dysmetria on the right side and in proportion to the weakness on the left side     LABS:                        13.0   7.67  )-----------( 218      ( 07 Aug 2018 08:03 )             38.7    08-07    142  |  108  |  10  ----------------------------<  93  3.6   |  23  |  0.80    Ca    8.4      07 Aug 2018 06:22    TPro  6.2  /  Alb  3.6  /  TBili  0.6  /  DBili  x   /  AST  13  /  ALT  11  /  AlkPhos  65  08-07  PT/INR - ( 06 Aug 2018 21:43 )   PT: 11.3 sec;   INR: 1.04 ratio         PTT - ( 06 Aug 2018 21:43 )  PTT:29.3 sec  Hemoglobin A1C, Whole Blood: 5.2 % (08-07 @ 08:03)      IMAGING: Reviewed by me.   CT Head No Cont (08.06.18)   No acute intracranial hemorrhage, mass effect, vasogenic edema, or   evidence of acute territorial infarct.    (08.06.18)   CTA BRAIN: Patent intracranial circulation. No flow-limiting stenosis,   occlusion, or aneurysm.    CTA NECK: Patent cervical vasculature. No flow limiting stenosis,   occlusion, or dissection.

## 2018-08-07 NOTE — DISCHARGE NOTE ADULT - MEDICATION SUMMARY - MEDICATIONS TO TAKE
I will START or STAY ON the medications listed below when I get home from the hospital:    Ventolin HFA 90 mcg/inh inhalation aerosol  -- 2 puff(s) inhaled 2 times a day, As Needed  -- Indication: For Respiratory distress

## 2018-08-07 NOTE — DISCHARGE NOTE ADULT - PLAN OF CARE
Symptom management Continue taking medications as reconciled  Follow up with neurology outpatient for continuation of workup Continue taking medications as reconciled  Check TTE w/ bubble outpatient, possible loop recorder post TTE  Follow up hypercoagulable workup  Follow up with neurology outpatient for continuation of workup

## 2018-08-07 NOTE — SWALLOW VFSS/MBS ASSESSMENT ADULT - INTACT
Yes No/there was evidence suggestive of trace penetration, possibly via the interarytenoid space. This cleared spontaneously during the swallow Noted + cough, but no s/s aspiration/laryngeal penetration/Yes There was evidence suggestive of trace penetration, possibly via the interarytenoid space. This cleared spontaneously during the swallow. Also noted a narrow column of contrast between the velum and the posterior pharyngeal wall (nasal regurgitation). This cleared spontaneously as well/No

## 2018-08-07 NOTE — PROGRESS NOTE ADULT - ASSESSMENT
ASSESSMENT: 22 years old woman with no known vascular risk factors is evaluated at [facility] for acute onset left-sided weakness. She reports to be last normal at around 8:30-8:45 PM day of admission, when she noticed the acute onset of numbness over the left face. Soon after that she noticed tingling/numbness over the left arm and leg as well as left-sided weakness. She presented to [facility] for further evaluation. CT brain on admission did not show any evidence of acute infarct or hemorrhage. CTA head and neck did not show any symptomatic intracranial or extracranial cerebral large vessel severe stenosis or occlusion.    Impression:  Cerebral thrombosis/embolism with cerebral infarction. Right OKSANA/MCA distribution stroke - the exact etiology of her stroke remains unclear and requires further evaluation    NEURO: neurologically noted with fluctuations, continue close monitoring for neurologic deterioration, permissive HTN, titrate statin to LDL goal less than 70 if indicated, LDL pending, MR head and c spine pending, PT/OT eval pending.     ANTITHROMBOTIC THERAPY: ASA post TPA protocol permitting repeat imaging stable for secondary stroke prevention     PULMONARY:  protecting airway, saturating well     CARDIOVASCULAR: check TTE, cardiac monitoring                              SBP goal:     GASTROINTESTINAL:  dysphagia screen failed, SLP eval pending       Diet: NPO    RENAL: BUN/Cr without acute change, IVF while NPO, good urine output      Na Goal: Greater than 135     Clancy: n     HEMATOLOGY: H/H without acute change, no active bleeding, Platelets 218, check hypercoag panel and LE duplex      DVT ppx: venodynes for now, chemical dvt ppx once TPA protocol complete permitting stable.    ID: afebrile, no leukocytosis     OTHER: utox pending, plan of care d/w patient and family at bedside, all questions answered and concenrs addressed.     DISPOSITION: Rehab or home depending on PT eval once stable and workup is complete      CORE MEASURES:        Admission NIHSS: 4     TPA: [x] YES [] NO      LDL/HDL:p     Depression Screen: p     Statin Therapy: n, LDL pending     Dysphagia Screen: [] PASS [x] FAIL     Smoking [] YES [x] NO      Afib [] YES [x] NO     Stroke Education [] YES [] NO 22 years old woman with no known vascular risk factors is evaluated at [facility] for acute onset left-sided weakness. She reports to be last normal at around 8:30-8:45 PM day of admission, when she noticed the acute onset of numbness over the left face. Soon after that she noticed tingling/numbness over the left arm and leg as well as left-sided weakness. She presented to [facility] for further evaluation. CT brain on admission did not show any evidence of acute infarct or hemorrhage. CTA head and neck did not show any symptomatic intracranial or extracranial cerebral large vessel severe stenosis or occlusion.    Impression:  Cerebral thrombosis/embolism with cerebral infarction. Right OKSANA/MCA distribution stroke - the exact etiology of her stroke remains unclear and requires further evaluation    NEURO: neurologically noted with fluctuations, continue close monitoring for neurologic deterioration, permissive HTN, titrate statin to LDL goal less than 70 if indicated, LDL pending, MR head and c spine pending, PT/OT eval pending.     ANTITHROMBOTIC THERAPY: ASA post TPA protocol permitting repeat imaging stable for secondary stroke prevention     PULMONARY:  protecting airway, saturating well     CARDIOVASCULAR: check TTE, cardiac monitoring                              SBP goal:     GASTROINTESTINAL:  dysphagia screen failed, SLP recommends dysphagia 1 with nectar thick liquids      Diet: dysphagia 1 with nectar thick liquids     RENAL: BUN/Cr without acute change, IVF while NPO, good urine output      Na Goal: Greater than 135     Clancy: n     HEMATOLOGY: H/H without acute change, no active bleeding, Platelets 218, check hypercoag panel and LE duplex      DVT ppx: venodynes for now, chemical dvt ppx once TPA protocol complete permitting stable.    ID: afebrile, no leukocytosis     OTHER: utox pending, plan of care d/w patient and family at bedside, all questions answered and concenrs addressed.     DISPOSITION: Rehab or home depending on PT eval once stable and workup is complete      CORE MEASURES:        Admission NIHSS: 4     TPA: [x] YES [] NO      LDL/HDL:p     Depression Screen: p     Statin Therapy: n, LDL pending     Dysphagia Screen: [] PASS [x] FAIL     Smoking [] YES [x] NO      Afib [] YES [x] NO     Stroke Education [] YES [] NO Assessment:  22 years old woman with no known vascular risk factors is evaluated at [facility] for acute onset left-sided weakness. She reports to be last normal at around 8:30-8:45 PM on the day of admission, when she noticed the acute onset of numbness over the left face. Soon after that she noticed tingling/numbness over the left arm and leg as well as left-sided weakness. She presented to [facility] for further evaluation. CT brain on admission did not show any evidence of acute infarct or hemorrhage. CTA head and neck did not show any symptomatic intracranial or extracranial cerebral large vessel severe stenosis or occlusion. Of note, she developed headache subsequently associated with nausea without vomiting. In the morning of 8/7, she reports to have developed right leg sensory paresthesia in the form of tingling/numbness. She denies any recent change in her bowel or bladder habits.    Impression:  Her presentation is consistent with right brain dysfunction (hemispheric versus brainstem) of undetermined etiology and differential diagnoses include cerebrovascular phenomena like a stroke of undetermined etiology, nonconvulsive seizure or migraine with aura    NEURO: neurologically noted with fluctuations, continue close monitoring for neurologic deterioration, permissive HTN, statin to be considered based on medical indications, LDL pending, MRI brain and C-spine with and without contrast pending, PT/OT eval pending.     ANTITHROMBOTIC THERAPY: ASA post tPA protocol permitting repeat imaging stable for secondary stroke prevention     PULMONARY: protecting airway, saturating well     CARDIOVASCULAR: check TTE with bubble study and continue with cardiac monitoring                              SBP goal: Gradual normotension     GASTROINTESTINAL:  dysphagia screen failed, SLP recommends dysphagia 1 with nectar thick liquids      Diet: dysphagia 1 with nectar thick liquids     RENAL: BUN/Cr without acute change, IVF while NPO, good urine output      Na Goal: Greater than 135     Clancy: n     HEMATOLOGY: H/H without acute change, no active bleeding, Platelets 218, check hypercoag panel and LE duplex      DVT ppx: venodynes for now, chemical dvt ppx once tPA protocol complete permitting stable.    ID: afebrile, no leukocytosis     OTHER: U-tox pending, plan of care d/w patient and family at bedside, all questions answered and concerns were addressed.     DISPOSITION: Rehab or home depending on PT eval once stable and workup is complete    CORE MEASURES:        Admission NIHSS: 4     TPA: [x] YES [] NO      LDL/HDL:p     Depression Screen: p     Statin Therapy: n, LDL pending     Dysphagia Screen: [] PASS [x] FAIL     Smoking [] YES [x] NO      Afib [] YES [x] NO     Stroke Education [] YES [] NO

## 2018-08-07 NOTE — SWALLOW VFSS/MBS ASSESSMENT ADULT - ADDITIONAL INFORMATION
Disorders: delay in trigger of the swallow reflex with low trigger points Disorders: delay in trigger of the swallow reflex with low trigger points and reduced velar contact with posterior pharyngeal wall

## 2018-08-07 NOTE — SWALLOW VFSS/MBS ASSESSMENT ADULT - ORAL PHASE COMMENTS
Inconsistent maximal spillover to the level of the valleculae and mild to moderate passive spillover to the pyriform sinuses

## 2018-08-07 NOTE — SWALLOW BEDSIDE ASSESSMENT ADULT - ASR SWALLOW ASPIRATION MONITOR
fever/pneumonia/throat clearing/upper respiratory infection/Monitor for s/s aspiration/laryngeal penetration. If noted:  D/C p.o. intake, provide non-oral nutrition/hydration/meds, and contact this service @ [phone]/change of breathing pattern/cough/gurgly voice

## 2018-08-07 NOTE — SWALLOW BEDSIDE ASSESSMENT ADULT - SWALLOW EVAL: DIAGNOSIS
Patient presents with evidence suggestive of pharyngeal dysphagia with soft s/s laryngeal penetration/aspiration on solids and reports of failed RN dysphagia screen on thin liquids

## 2018-08-07 NOTE — DISCHARGE NOTE ADULT - CARE PLAN
Principal Discharge DX:	Brain dysfunction  Goal:	Symptom management  Assessment and plan of treatment:	Continue taking medications as reconciled  Follow up with neurology outpatient for continuation of workup Principal Discharge DX:	Brain dysfunction  Goal:	Symptom management  Assessment and plan of treatment:	Continue taking medications as reconciled  Check TTE w/ bubble outpatient, possible loop recorder post TTE  Follow up hypercoagulable workup  Follow up with neurology outpatient for continuation of workup

## 2018-08-07 NOTE — DISCHARGE NOTE ADULT - PATIENT PORTAL LINK FT
You can access the [facility] Patient Portal, offered by [facility], by registering with the following website: [email or web address]

## 2018-08-07 NOTE — SWALLOW VFSS/MBS ASSESSMENT ADULT - MODE OF PRESENTATION
spoon cup/spoon/self fed/fed by clinician straw/cup/self fed fed by clinician spoon/fed by clinician

## 2018-08-07 NOTE — DISCHARGE NOTE ADULT - CARE PROVIDER_API CALL
Donavon Cleary), Neurology; Vascular Neurology  [address] [place] [address]  Phone: [phone]  Fax: [phone]

## 2018-08-07 NOTE — SWALLOW BEDSIDE ASSESSMENT ADULT - SLP GENERAL OBSERVATIONS
Pt alert, able to follow command, oriented. Reported that on the date of admit, she was suddenly aphasic (mixed), dysarthric. Now reports resolution.  still with R hand clumsiness and stated that L face with mild V3 paresthesia

## 2018-08-07 NOTE — ED ADULT NURSE REASSESSMENT NOTE - NS ED NURSE REASSESS COMMENT FT1
Pt states that she developed sudden onset of head pressure with nausea and dizziness, and difficulty keeping eyes open.  MD Parnell made aware and is consulting neuro.  Migraine cocktail and CT head

## 2018-08-07 NOTE — CHART NOTE - NSCHARTNOTEFT_GEN_A_CORE
Impression:     Plan:   []CTH   []CTA H/N   []MRI brain w/o   []MRA H/N   []tele   []b/l dopplers   []TTE w/ bubble, possible CARLOS   Meds: []ASA []statin []DVT prophylaxis   Labs: []A1c []LDL []Hypercoagu Impression:     Plan:   [x]CTH: No acute intracranial hemorrhage, mass effect, vasogenic edema, or evidence of acute territorial infarct.  [x]CTA H/N: Patent intracranial circulation. No flow-limiting stenosis, occlusion, or aneurysm. Patent cervical vasculature. No flow limiting stenosis, occlusion, or dissection.  []MRI brain w/ w/o: ordered  []MRI C-spine w/ w/o: ordered   [x]tele   []b/l dopplers: ordered   []TTE w/ bubble: ordered   Meds: [hold s/p 24 hours tPA]ASA [hold s/p 24 hours tPA]DVT prophylaxis   Labs: []A1c []LDL []Hypercoguable     CT Head No Cont (08.06.18)       (08.06.18) Impression: Left hemiparesis w/ dysphagia 2/2 right brain dysfunction of unknown mechanism s/p tPA (1019pm 8/6/18)    Plan:   [x]CTH: No acute intracranial hemorrhage, mass effect, vasogenic edema, or evidence of acute territorial infarct.  [x]CTA H/N: Patent intracranial circulation. No flow-limiting stenosis, occlusion, or aneurysm. Patent cervical vasculature. No flow limiting stenosis, occlusion, or dissection.  []MRI brain w/ w/o: ordered  []MRI C-spine w/ w/o: ordered   [x]tele   []b/l dopplers: ordered   []TTE w/ bubble: ordered   Meds: [hold s/p 24 hours tPA]ASA [hold s/p 24 hours tPA]DVT prophylaxis [1019pm 8/6/18]tPA  Labs: [5.2]A1c []LDL []Hypercoagulable panel    Consults: [D1 nector, MBS pending]SS  []PT/OT   Recommendations  -neuro checks  -[facility] 8/7/18 3pm (will need assist)     2)Med Rec     Dispo:     Overnight: Impression: Left hemiparesis w/ dysphagia 2/2 right brain dysfunction of unknown mechanism s/p tPA (1019pm 8/6/18)    Plan:   [x]CTH: No acute intracranial hemorrhage, mass effect, vasogenic edema, or evidence of acute territorial infarct.  [x]CTA H/N: Patent intracranial circulation. No flow-limiting stenosis, occlusion, or aneurysm. Patent cervical vasculature. No flow limiting stenosis, occlusion, or dissection.  []MRI brain w/ w/o: ordered  []MRI C-spine w/ w/o: ordered   [x]tele   []b/l dopplers: ordered   []TTE w/ bubble: ordered   Meds: [hold s/p 24 hours tPA]ASA [hold s/p 24 hours tPA]DVT prophylaxis [1019pm 8/6/18]tPA  Labs: [5.2]A1c []LDL [x]Hypercoagulable panel    Consults: [D1 nector, MBS pending]SS  []PT/OT   Recommendations  -neuro checks    2)Med Rec     Dispo:     Overnight:  resume ASA/DVT once repeat imagine shows no bleed/ stable.

## 2018-08-07 NOTE — SWALLOW VFSS/MBS ASSESSMENT ADULT - DIAGNOSTIC IMPRESSIONS
Patient presents with mild elena-pharyngeal dysphagia with delay in trigger of the swallow reflex and trace penetration (with spontaneous clearance) of thin liquids.

## 2018-08-07 NOTE — DISCHARGE NOTE ADULT - HOSPITAL COURSE
Patient is a 22 year old right handed  female no PMH presenting to the ED with left sided weakness.. States last known normal was 8:55pm on the night prior to admission. Around 9pm after she took a shower, she noticed that her left face was numb, which over 30 minutes, started to involve her left arm and leg. Patient reported feeling weak in her arm and leg as well. Stated she has difficulty comprehending what I am saying to her. Stroke code called, NIHSS 4, TPA given.     Patient admitted to the stroke service. CTH done, no acute intracranial hemorrhage, mass effect, vasogenic edema, or evidence of acute territorial infarct. CTA H/N with patent intracranial circulation, no flow-limiting stenosis, occlusion, or aneurysm. MRI brain w/o contrast without acute infarct. Left hemiparesis secondary to R brain dysfunction 2/2 Patient is a 22 year old right handed  female no PMH presenting to the ED with left sided weakness.. States last known normal was 8:55pm on the night prior to admission. Around 9pm after she took a shower, she noticed that her left face was numb, which over 30 minutes, started to involve her left arm and leg. Patient reported feeling weak in her arm and leg as well. Stated she has difficulty comprehending what I am saying to her. Stroke code called, NIHSS 4, TPA given.     Patient admitted to the stroke service. CTH done, no acute intracranial hemorrhage, mass effect, vasogenic edema, or evidence of acute territorial infarct. CTA H/N with patent intracranial circulation, no flow-limiting stenosis, occlusion, or aneurysm. MRI brain w/o contrast without acute infarct. Left hemiparesis secondary to R brain dysfunction secondary to unknown cause, no evidence of CVA on MRI brain. Patient stable for discharge and follow up with outpatient neurology to complete remainder of workup.

## 2018-08-08 VITALS
RESPIRATION RATE: 18 BRPM | SYSTOLIC BLOOD PRESSURE: 96 MMHG | DIASTOLIC BLOOD PRESSURE: 74 MMHG | TEMPERATURE: 99 F | HEART RATE: 79 BPM | OXYGEN SATURATION: 98 %

## 2018-08-08 LAB
AMPHET UR-MCNC: NEGATIVE — SIGNIFICANT CHANGE UP
BARBITURATES, URINE.: NEGATIVE — SIGNIFICANT CHANGE UP
BENZODIAZ UR-MCNC: NEGATIVE — SIGNIFICANT CHANGE UP
COCAINE METAB.OTHER UR-MCNC: NEGATIVE — SIGNIFICANT CHANGE UP
CREATININE, URINE THERAPEUTIC: 144.1 MG/DL — SIGNIFICANT CHANGE UP
HCYS SERPL-MCNC: 7.1 UMOL/L — SIGNIFICANT CHANGE UP (ref 5–15)
METHADONE UR-MCNC: NEGATIVE — SIGNIFICANT CHANGE UP
METHAQUALONE UR QL: NEGATIVE — SIGNIFICANT CHANGE UP
METHAQUALONE UR-MCNC: NEGATIVE — SIGNIFICANT CHANGE UP
OPIATES UR-MCNC: NEGATIVE — SIGNIFICANT CHANGE UP
PCP UR-MCNC: NEGATIVE — SIGNIFICANT CHANGE UP
PROPOXYPH UR QL: NEGATIVE — SIGNIFICANT CHANGE UP
THC UR QL: NEGATIVE — SIGNIFICANT CHANGE UP

## 2018-08-08 PROCEDURE — 93005 ELECTROCARDIOGRAM TRACING: CPT

## 2018-08-08 PROCEDURE — 70450 CT HEAD/BRAIN W/O DYE: CPT

## 2018-08-08 PROCEDURE — 83090 ASSAY OF HOMOCYSTEINE: CPT

## 2018-08-08 PROCEDURE — 92610 EVALUATE SWALLOWING FUNCTION: CPT

## 2018-08-08 PROCEDURE — 85610 PROTHROMBIN TIME: CPT

## 2018-08-08 PROCEDURE — 85300 ANTITHROMBIN III ACTIVITY: CPT

## 2018-08-08 PROCEDURE — 81001 URINALYSIS AUTO W/SCOPE: CPT

## 2018-08-08 PROCEDURE — 93970 EXTREMITY STUDY: CPT

## 2018-08-08 PROCEDURE — 80053 COMPREHEN METABOLIC PANEL: CPT

## 2018-08-08 PROCEDURE — A9585: CPT

## 2018-08-08 PROCEDURE — 85303 CLOT INHIBIT PROT C ACTIVITY: CPT

## 2018-08-08 PROCEDURE — 70498 CT ANGIOGRAPHY NECK: CPT

## 2018-08-08 PROCEDURE — 81291 MTHFR GENE: CPT

## 2018-08-08 PROCEDURE — 86146 BETA-2 GLYCOPROTEIN ANTIBODY: CPT

## 2018-08-08 PROCEDURE — 85306 CLOT INHIBIT PROT S FREE: CPT

## 2018-08-08 PROCEDURE — 80307 DRUG TEST PRSMV CHEM ANLYZR: CPT

## 2018-08-08 PROCEDURE — 80061 LIPID PANEL: CPT

## 2018-08-08 PROCEDURE — 99233 SBSQ HOSP IP/OBS HIGH 50: CPT

## 2018-08-08 PROCEDURE — 70553 MRI BRAIN STEM W/O & W/DYE: CPT

## 2018-08-08 PROCEDURE — 93970 EXTREMITY STUDY: CPT | Mod: 26

## 2018-08-08 PROCEDURE — 86147 CARDIOLIPIN ANTIBODY EA IG: CPT

## 2018-08-08 PROCEDURE — 82962 GLUCOSE BLOOD TEST: CPT

## 2018-08-08 PROCEDURE — 97161 PT EVAL LOW COMPLEX 20 MIN: CPT

## 2018-08-08 PROCEDURE — 85730 THROMBOPLASTIN TIME PARTIAL: CPT

## 2018-08-08 PROCEDURE — G0452: CPT | Mod: 26

## 2018-08-08 PROCEDURE — 85307 ASSAY ACTIVATED PROTEIN C: CPT

## 2018-08-08 PROCEDURE — 81240 F2 GENE: CPT

## 2018-08-08 PROCEDURE — 72156 MRI NECK SPINE W/O & W/DYE: CPT

## 2018-08-08 PROCEDURE — 84484 ASSAY OF TROPONIN QUANT: CPT

## 2018-08-08 PROCEDURE — 83036 HEMOGLOBIN GLYCOSYLATED A1C: CPT

## 2018-08-08 PROCEDURE — 85613 RUSSELL VIPER VENOM DILUTED: CPT

## 2018-08-08 PROCEDURE — 85301 ANTITHROMBIN III ANTIGEN: CPT

## 2018-08-08 PROCEDURE — 92611 MOTION FLUOROSCOPY/SWALLOW: CPT

## 2018-08-08 PROCEDURE — 97165 OT EVAL LOW COMPLEX 30 MIN: CPT

## 2018-08-08 PROCEDURE — 84702 CHORIONIC GONADOTROPIN TEST: CPT

## 2018-08-08 PROCEDURE — 99291 CRITICAL CARE FIRST HOUR: CPT | Mod: 25

## 2018-08-08 PROCEDURE — 85027 COMPLETE CBC AUTOMATED: CPT

## 2018-08-08 PROCEDURE — 74230 X-RAY XM SWLNG FUNCJ C+: CPT

## 2018-08-08 PROCEDURE — 70496 CT ANGIOGRAPHY HEAD: CPT

## 2018-08-08 PROCEDURE — 81241 F5 GENE: CPT

## 2018-08-08 RX ORDER — ENOXAPARIN SODIUM 100 MG/ML
40 INJECTION SUBCUTANEOUS DAILY
Qty: 0 | Refills: 0 | Status: DISCONTINUED | OUTPATIENT
Start: 2018-08-08 | End: 2018-08-08

## 2018-08-08 RX ORDER — ENOXAPARIN SODIUM 100 MG/ML
40 INJECTION SUBCUTANEOUS ONCE
Qty: 0 | Refills: 0 | Status: COMPLETED | OUTPATIENT
Start: 2018-08-08 | End: 2018-08-08

## 2018-08-08 RX ADMIN — Medication 650 MILLIGRAM(S): at 11:50

## 2018-08-08 RX ADMIN — Medication 650 MILLIGRAM(S): at 12:55

## 2018-08-08 RX ADMIN — ENOXAPARIN SODIUM 40 MILLIGRAM(S): 100 INJECTION SUBCUTANEOUS at 01:45

## 2018-08-08 NOTE — CHART NOTE - NSCHARTNOTEFT_GEN_A_CORE
Impression: Left hemiparesis w/ dysphagia 2/2 right brain dysfunction of unknown mechanism s/p tPA (1019pm 8/6/18)    Plan:   [x]CTH: No acute intracranial hemorrhage, mass effect, vasogenic edema, or evidence of acute territorial infarct.  [x]CTA H/N: Patent intracranial circulation. No flow-limiting stenosis, occlusion, or aneurysm. Patent cervical vasculature. No flow limiting stenosis, occlusion, or dissection.  []MRI brain w/ w/o: ordered  []MRI C-spine w/ w/o: ordered   [x]tele   []b/l dopplers: ordered   []TTE w/ bubble: ordered   Meds: [hold s/p 24 hours tPA]ASA [hold s/p 24 hours tPA]DVT prophylaxis [1019pm 8/6/18]tPA  Labs: [5.2]A1c [45]LDL [x]Hypercoagulable panel    Consults: [D1 nector, MBS pending]SS  []PT/OT   Recommendations  -neuro checks    2)Med Rec     Dispo:     Overnight:  resume ASA/DVT once repeat imagine shows no bleed/ stable.

## 2018-08-08 NOTE — OCCUPATIONAL THERAPY INITIAL EVALUATION ADULT - NS ASR FOLLOW COMMAND OT EVAL
mild word finding difficulties, naming intact/100% of the time/able to follow multistep instructions

## 2018-08-08 NOTE — OCCUPATIONAL THERAPY INITIAL EVALUATION ADULT - PERTINENT HX OF CURRENT PROBLEM, REHAB EVAL
Pt is a 22 year old right handed  female presenting to the ED with left sided weakness. Patient has no PMH. States last known normal was 8:55pm. Around 9pm after she took a shower, she noticed that her left face was numb, which over 30 minutes, started to involve her left arm and leg. Patient reports feeling weak in her arm and leg as well.

## 2018-08-08 NOTE — PROGRESS NOTE ADULT - ASSESSMENT
22 years old woman with no known vascular risk factors is evaluated at [facility] for acute onset left-sided weakness. She reports to be last normal at around 8:30-8:45 PM on the day of admission, when she noticed the acute onset of numbness over the left face. Soon after that she noticed tingling/numbness over the left arm and leg as well as left-sided weakness. She presented to [facility] for further evaluation. CT brain on admission did not show any evidence of acute infarct or hemorrhage. CTA head and neck did not show any symptomatic intracranial or extracranial cerebral large vessel severe stenosis or occlusion. Of note, she developed headache subsequently associated with nausea without vomiting. In the morning of 8/7, she reports to have developed right leg sensory paresthesia in the form of tingling/numbness. She denies any recent change in her bowel or bladder habits.    Impression:  Her presentation is consistent with migraine with aura    NEURO: neurologically noted without acute change-back to baseline, permissive HTN, statin to be considered based on medical indications, LDL pending, MRI brain and C-spine with and without contrast pending, PT/OT eval recommended no skilled PT needs.     ANTITHROMBOTIC THERAPY: no indication at this time     PULMONARY: protecting airway, saturating well     CARDIOVASCULAR: cardiac monitoring with no events                              SBP goal: Gradual normotension     GASTROINTESTINAL:  dysphagia screen failed, SLP recommends dysphagia 1 with nectar thick liquids      Diet: dysphagia 1 with nectar thick liquids     RENAL: BUN/Cr without acute change previously, good urine output      Na Goal: Greater than 135     Clancy: n     HEMATOLOGY: no active bleeding, check hypercoag panel, LE doppler without evidence of DVT     DVT ppx: venodynes/Lovenox subq    ID: afebrile, no leukocytosis previously     OTHER: U-tox pending, plan of care d/w patient and family at bedside, all questions answered and concerns were addressed.     DISPOSITION: Home    CORE MEASURES:        Admission NIHSS: 4     TPA: [x] YES [] NO      LDL/HDL:p     Depression Screen: p     Statin Therapy: n, LDL pending     Dysphagia Screen: [] PASS [x] FAIL     Smoking [] YES [x] NO      Afib [] YES [x] NO     Stroke Education [] YES [] NO Assessment:  22 years old woman with no known vascular risk factors is evaluated at [facility] for acute onset left-sided weakness. She reports to be last normal at around 8:30-8:45 PM on the day of admission, when she noticed the acute onset of numbness over the left face. Soon after that she noticed tingling/numbness over the left arm and leg as well as left-sided weakness. She presented to [facility] for further evaluation. CT brain on admission did not show any evidence of acute infarct or hemorrhage. CTA head and neck did not show any symptomatic intracranial or extracranial cerebral large vessel severe stenosis or occlusion. After discussing the risks versus benefits, she was treated with IV tPA. Of note, she developed headache subsequently associated with nausea without vomiting. In the morning of 8/7, she reports to have developed right leg sensory paresthesia in the form of tingling/numbness. MRI brain did not show any evidence of acute infarct or hemorrhage. MRI C-spine did not show any significant spinal canal stenosis.     Impression:  Her presentation is consistent with syndrome of right brain (hemispheres versus brainstem) dysfunction likely secondary to cortical spreading depression due to migraine with aura     NEURO: neurologically noted without acute change-back to baseline, BP with gradual normal attention, LDL 45 - no indications for statin therapy from stroke prevention standpoint, MRI brain and C-spine with and without contrast pending, PT/OT eval recommended no skilled PT needs.     ANTITHROMBOTIC THERAPY: No indication from primary ischemic stroke prevention standpoint    PULMONARY: protecting airway, saturating well     CARDIOVASCULAR: cardiac monitoring with no events; Transthoracic echocardiogram with bubble study and cardiac monitoring with 30 days event monitor to be performed as outpatient                             SBP goal: Gradual normotension     GASTROINTESTINAL:  dysphagia screen failed, SLP recommends dysphagia 1 with nectar thick liquids      Diet: dysphagia 1 with nectar thick liquids     RENAL: BUN/Cr without acute change previously, good urine output      Na Goal: Greater than 135     Clancy: n     HEMATOLOGY: no active bleeding, check hypercoag panel, LE doppler without evidence of DVT     DVT ppx: venodynes/Lovenox subq    ID: afebrile, no leukocytosis previously     OTHER: U-tox pending, plan of care d/w patient and family at bedside, all questions answered and concerns were addressed.     DISPOSITION: Home    CORE MEASURES:        Admission NIHSS: 4     TPA: [x] YES [] NO      LDL/HDL:p     Depression Screen: p     Statin Therapy: n, LDL pending     Dysphagia Screen: [] PASS [x] FAIL     Smoking [] YES [x] NO      Afib [] YES [x] NO     Stroke Education [] YES [] NO

## 2018-08-08 NOTE — PROGRESS NOTE ADULT - SUBJECTIVE AND OBJECTIVE BOX
THE PATIENT WAS SEEN AND EXAMINED BY ME WITH THE HOUSESTAFF AND STROKE TEAM DURING MORNING ROUNDS.     HPI:  Patient is a 22 year old right handed  female presenting to the ED with left sided weakness. Patient has no PMH. States last known normal was 8:55pm. Around 9pm after she took a shower, she noticed that her left face was numb, which over 30 minutes, started to involve her left arm and leg. Patient reports feeling weak in her arm and leg as well. States she has difficulty comprehending what I am saying to her. Also reports nausea, photophobia, seeing spots. States numbness is getting worse while in the ED. (06 Aug 2018 23:02)    SUBJECTIVE: No events overnight.  No new neurologic complaints.      ROS: All negative except documented above.    acetaminophen   Tablet. 650 milliGRAM(s) Oral every 6 hours PRN  ALBUTerol    90 MICROgram(s) HFA Inhaler 2 Puff(s) Inhalation every 6 hours PRN  enoxaparin Injectable 40 milliGRAM(s) SubCutaneous daily    PHYSICAL EXAM:   Vital Signs Last 24 Hrs  T(C): 37.1 (08 Aug 2018 13:30), Max: 37.1 (08 Aug 2018 13:30)  T(F): 98.8 (08 Aug 2018 13:30), Max: 98.8 (08 Aug 2018 13:30)  HR: 79 (08 Aug 2018 13:30) (61 - 79)  BP: 96/74 (08 Aug 2018 13:30) (94/65 - 112/94)  BP(mean): 69 (08 Aug 2018 00:00) (69 - 100)  RR: 18 (08 Aug 2018 13:30) (12 - 19)  SpO2: 98% (08 Aug 2018 13:30) (96% - 100%)    General: No acute distress  HEENT: EOM intact, visual fields full  Abdomen: Soft, nontender, nondistended   Extremities: No edema    NEUROLOGICAL EXAM:  Mental status: Awake, alert, oriented x3, no neglect, normal memory, IDs objects   Cranial Nerves: slight left facial droop, no nystagmus, no dysarthria,  tongue midline  Motor exam: Normal tone, LUE drift, RUE 5/5, RLE 3/5 - effort related weakness likely secondary to pain, LUE with motor drift and LLE with some movement in the bed but not against gravity  Sensation: decreased to light touch over the left face, arm and leg    Coordination/ Gait: No dysmetria on the right side and in proportion to the weakness on the left side    LABS:                        13.0   7.67  )-----------( 218      ( 07 Aug 2018 08:03 )             38.7    08-07    142  |  108  |  10  ----------------------------<  93  3.6   |  23  |  0.80    Ca    8.4      07 Aug 2018 06:22    TPro  6.2  /  Alb  3.6  /  TBili  0.6  /  DBili  x   /  AST  13  /  ALT  11  /  AlkPhos  65  08-07  PT/INR - ( 06 Aug 2018 21:43 )   PT: 11.3 sec;   INR: 1.04 ratio        PTT - ( 06 Aug 2018 21:43 )  PTT:29.3 sec  Hemoglobin A1C, Whole Blood: 5.2 % (08-07 @ 08:03)    IMAGING: Reviewed by me.     CT Head No Cont (08.06.18)   No acute intracranial hemorrhage, mass effect, vasogenic edema, or   evidence of acute territorial infarct.    (08.06.18)   CTA BRAIN: Patent intracranial circulation. No flow-limiting stenosis,   occlusion, or aneurysm.    CTA NECK: Patent cervical vasculature. No flow limiting stenosis,   occlusion, or dissection. THE PATIENT WAS SEEN AND EXAMINED BY ME WITH THE HOUSESTAFF AND STROKE TEAM DURING MORNING ROUNDS.     HPI:  Patient is a 22 year old right handed  female presenting to the ED with left sided weakness. Patient has no PMH. States last known normal was 8:55pm. Around 9pm after she took a shower, she noticed that her left face was numb, which over 30 minutes, started to involve her left arm and leg. Patient reports feeling weak in her arm and leg as well. States she has difficulty comprehending what I am saying to her. Also reports nausea, photophobia, seeing spots. States numbness is getting worse while in the ED. (06 Aug 2018 23:02)    SUBJECTIVE: No events overnight. Reports complete resolution of her focal neurological symptoms and reports to be feeling back to normal.    ROS: All negative except documented above.    acetaminophen   Tablet. 650 milliGRAM(s) Oral every 6 hours PRN  ALBUTerol    90 MICROgram(s) HFA Inhaler 2 Puff(s) Inhalation every 6 hours PRN  enoxaparin Injectable 40 milliGRAM(s) SubCutaneous daily    PHYSICAL EXAM:   Vital Signs Last 24 Hrs  T(C): 37.1 (08 Aug 2018 13:30), Max: 37.1 (08 Aug 2018 13:30)  T(F): 98.8 (08 Aug 2018 13:30), Max: 98.8 (08 Aug 2018 13:30)  HR: 79 (08 Aug 2018 13:30) (61 - 79)  BP: 96/74 (08 Aug 2018 13:30) (94/65 - 112/94)  BP(mean): 69 (08 Aug 2018 00:00) (69 - 100)  RR: 18 (08 Aug 2018 13:30) (12 - 19)  SpO2: 98% (08 Aug 2018 13:30) (96% - 100%)    General: No acute distress  HEENT: EOM intact, visual fields full  Abdomen: Soft, nontender, nondistended   Extremities: No edema    NEUROLOGICAL EXAM:  Mental status: Awake, alert, oriented x3, no neglect, normal memory, IDs objects   Cranial Nerves: slight left facial droop, no nystagmus, no dysarthria,  tongue midline  Motor exam: Normal tone, LUE drift, RUE 5/5, RLE 3/5 - effort related weakness likely secondary to pain, LUE with motor drift and LLE with some movement in the bed but not against gravity  Sensation: decreased to light touch over the left face, arm and leg    Coordination/ Gait: No dysmetria on the right side and in proportion to the weakness on the left side    LABS:                        13.0   7.67  )-----------( 218      ( 07 Aug 2018 08:03 )             38.7    08-07    142  |  108  |  10  ----------------------------<  93  3.6   |  23  |  0.80    Ca    8.4      07 Aug 2018 06:22    TPro  6.2  /  Alb  3.6  /  TBili  0.6  /  DBili  x   /  AST  13  /  ALT  11  /  AlkPhos  65  08-07  PT/INR - ( 06 Aug 2018 21:43 )   PT: 11.3 sec;   INR: 1.04 ratio        PTT - ( 06 Aug 2018 21:43 )  PTT:29.3 sec  Hemoglobin A1C, Whole Blood: 5.2 % (08-07 @ 08:03)    IMAGING: Reviewed by me.     CT Head No Cont (08.06.18)   No acute intracranial hemorrhage, mass effect, vasogenic edema, or   evidence of acute territorial infarct.    (08.06.18)   CTA BRAIN: Patent intracranial circulation. No flow-limiting stenosis,   occlusion, or aneurysm.    CTA NECK: Patent cervical vasculature. No flow limiting stenosis,   occlusion, or dissection.

## 2018-08-08 NOTE — CHART NOTE - NSCHARTNOTEFT_GEN_A_CORE
Impression: Left hemiparesis w/ dysphagia 2/2 right brain dysfunction of unknown mechanism s/p tPA (1019pm 8/6/18)    Mirgaine w/ aura .....     Plan:   [x]CTH: No acute intracranial hemorrhage, mass effect, vasogenic edema, or evidence of acute territorial infarct.  [x]CTA H/N: Patent intracranial circulation. No flow-limiting stenosis, occlusion, or aneurysm. Patent cervical vasculature. No flow limiting stenosis, occlusion, or dissection.  []MRI brain w/ w/o: ordered  []MRI C-spine w/ w/o: ordered   [x]tele   []b/l dopplers: ordered   []TTE w/ bubble: ordered   Meds: [hold s/p 24 hours tPA]DVT prophylaxis [1019pm 8/6/18]tPA  Labs: [5.2]A1c [45]LDL [x]Hypercoagulable panel    Consults: [D1 nector, MBS pending]SS  []PT/OT   Recommendations  -neuro checks    2)Med Rec     Dispo:     Overnight:  resume DVT once repeat imagine shows no bleed/ stable. Impression: Left hemiparesis w/ dysphagia 2/2 migraine w/ aura s/p tPA (1019pm 8/6/18)    Plan:   [x]CTH: No acute intracranial hemorrhage, mass effect, vasogenic edema, or evidence of acute territorial infarct.  [x]CTA H/N: Patent intracranial circulation. No flow-limiting stenosis, occlusion, or aneurysm. Patent cervical vasculature. No flow limiting stenosis, occlusion, or dissection.  []MRI brain w/ w/o: ordered  []MRI C-spine w/ w/o: ordered   [x]tele   []b/l dopplers: ordered   []TTE w/ bubble: ordered   Meds: [hold s/p 24 hours tPA]DVT prophylaxis [1019pm 8/6/18]tPA  Labs: [5.2]A1c [45]LDL [x]Hypercoagulable panel    Consults: [D1 nector, MBS pending]SS  []PT/OT   Recommendations  -neuro checks    2)Med Rec     Dispo:     Overnight:  resume DVT once repeat imagine shows no bleed/ stable. Impression: Left hemiparesis w/ dysphagia 2/2 migraine w/ aura s/p tPA (1019pm 8/6/18)    Plan:   [x]CTH: No acute intracranial hemorrhage, mass effect, vasogenic edema, or evidence of acute territorial infarct.  [x]CTA H/N: Patent intracranial circulation. No flow-limiting stenosis, occlusion, or aneurysm. Patent cervical vasculature. No flow limiting stenosis, occlusion, or dissection.  []MRI brain w/ w/o: ordered  []MRI C-spine w/ w/o: ordered   [x]tele   []b/l dopplers: ordered   []TTE w/ bubble: ordered   Meds: [x]DVT prophylaxis [1019pm 8/6/18]tPA  Labs: [5.2]A1c [45]LDL [x]Hypercoagulable panel    Consults: [regular]SS  [no needs]PT/OT   Recommendations  -neuro checks  -follow up outpatient neurology for long term management and hypercoagulable workup     2)Med Rec not complete     Dispo: home no needs

## 2018-08-08 NOTE — PHYSICAL THERAPY INITIAL EVALUATION ADULT - ADDITIONAL COMMENTS
Pt lives in a second floor apartment of a private home with parents. +10 steps to enter with handrail on R. Pt states prior to admission she was independent with all functional mobility and did not use an AD to ambulate. Pt works in [employer].

## 2018-08-08 NOTE — OCCUPATIONAL THERAPY INITIAL EVALUATION ADULT - ADDITIONAL COMMENTS
States she has difficulty comprehending what I am saying to her. Also reports nausea, photophobia, seeing spots. States numbness is getting worse while in the ED. CTH (-)

## 2018-08-09 LAB
AT III ACT/NOR PPP CHRO: 78 % — LOW (ref 85–135)
AT III AG PPP IA-MCNC: 25 MG/DL — SIGNIFICANT CHANGE UP (ref 19–31)
B2 GLYCOPROT1 AB SER QL: NEGATIVE — SIGNIFICANT CHANGE UP
CARDIOLIPIN AB SER-ACNC: NEGATIVE — SIGNIFICANT CHANGE UP
CHOLEST SERPL-MCNC: 107 MG/DL — SIGNIFICANT CHANGE UP (ref 10–199)
DRVVT SCREEN TO CONFIRM RATIO: SIGNIFICANT CHANGE UP
HDLC SERPL-MCNC: 49 MG/DL — SIGNIFICANT CHANGE UP (ref 40–125)
LA NT DPL PPP QL: 24.1 SEC — SIGNIFICANT CHANGE UP
LIPID PNL WITH DIRECT LDL SERPL: 42 MG/DL — SIGNIFICANT CHANGE UP
PROT C ACT/NOR PPP: 78 % — SIGNIFICANT CHANGE UP (ref 74–150)
TOTAL CHOLESTEROL/HDL RATIO MEASUREMENT: 2.2 RATIO — LOW (ref 3.3–7.1)
TRIGL SERPL-MCNC: 82 MG/DL — SIGNIFICANT CHANGE UP (ref 10–149)

## 2018-08-10 LAB — APCR PPP: 2.32 RATIO — SIGNIFICANT CHANGE UP

## 2018-08-13 LAB — PROT S FREE PPP-ACNC: 68 % NORMAL — SIGNIFICANT CHANGE UP (ref 60–140)

## 2018-08-14 LAB
DNA PLOIDY SPEC FC-IMP: SIGNIFICANT CHANGE UP
MTHFR GENE INTERPRETATION: SIGNIFICANT CHANGE UP
PTR INTERPRETATION: SIGNIFICANT CHANGE UP

## 2018-08-28 DIAGNOSIS — Z71.89 OTHER SPECIFIED COUNSELING: ICD-10-CM

## 2018-10-15 ENCOUNTER — APPOINTMENT (OUTPATIENT)
Dept: NEUROLOGY | Facility: CLINIC | Age: 23
End: 2018-10-15
Payer: MEDICAID

## 2018-10-15 ENCOUNTER — OTHER (OUTPATIENT)
Age: 23
End: 2018-10-15

## 2018-10-15 VITALS
HEART RATE: 63 BPM | SYSTOLIC BLOOD PRESSURE: 102 MMHG | BODY MASS INDEX: 25.69 KG/M2 | WEIGHT: 145 LBS | DIASTOLIC BLOOD PRESSURE: 67 MMHG | HEIGHT: 63 IN

## 2018-10-15 DIAGNOSIS — Z87.09 PERSONAL HISTORY OF OTHER DISEASES OF THE RESPIRATORY SYSTEM: ICD-10-CM

## 2018-10-15 DIAGNOSIS — G43.109 MIGRAINE WITH AURA, NOT INTRACTABLE, W/OUT STATUS MIGRAINOSUS: ICD-10-CM

## 2018-10-15 DIAGNOSIS — Z82.49 FAMILY HISTORY OF ISCHEMIC HEART DISEASE AND OTHER DISEASES OF THE CIRCULATORY SYSTEM: ICD-10-CM

## 2018-10-15 PROCEDURE — 99215 OFFICE O/P EST HI 40 MIN: CPT

## 2018-10-15 RX ORDER — COPPER 313.4 MG/1
INTRAUTERINE DEVICE INTRAUTERINE
Refills: 0 | Status: DISCONTINUED | COMMUNITY
End: 2018-10-15

## 2018-10-19 ENCOUNTER — APPOINTMENT (OUTPATIENT)
Dept: NEUROLOGY | Facility: CLINIC | Age: 23
End: 2018-10-19
Payer: MEDICAID

## 2018-10-19 PROCEDURE — 93892 TCD EMBOLI DETECT W/O INJ: CPT

## 2018-10-19 PROCEDURE — 93886 INTRACRANIAL COMPLETE STUDY: CPT

## 2018-10-19 PROCEDURE — 93880 EXTRACRANIAL BILAT STUDY: CPT

## 2018-10-22 ENCOUNTER — APPOINTMENT (OUTPATIENT)
Dept: NEUROLOGY | Facility: CLINIC | Age: 23
End: 2018-10-22
Payer: MEDICAID

## 2018-10-22 PROCEDURE — 95819 EEG AWAKE AND ASLEEP: CPT

## 2018-10-23 ENCOUNTER — EMERGENCY (EMERGENCY)
Facility: HOSPITAL | Age: 23
LOS: 1 days | Discharge: HOME | End: 2018-10-23
Attending: EMERGENCY MEDICINE
Payer: MEDICAID

## 2018-10-23 VITALS
RESPIRATION RATE: 18 BRPM | OXYGEN SATURATION: 95 % | SYSTOLIC BLOOD PRESSURE: 133 MMHG | HEART RATE: 110 BPM | DIASTOLIC BLOOD PRESSURE: 89 MMHG | TEMPERATURE: 100 F

## 2018-10-23 DIAGNOSIS — Z90.49 ACQUIRED ABSENCE OF OTHER SPECIFIED PARTS OF DIGESTIVE TRACT: Chronic | ICD-10-CM

## 2018-10-23 LAB
ALBUMIN SERPL ELPH-MCNC: 4.4 G/DL — SIGNIFICANT CHANGE UP (ref 3.3–5)
ALP SERPL-CCNC: 100 U/L — SIGNIFICANT CHANGE UP (ref 40–120)
ALT FLD-CCNC: 13 U/L — SIGNIFICANT CHANGE UP (ref 10–45)
ANION GAP SERPL CALC-SCNC: 12 MMOL/L — SIGNIFICANT CHANGE UP (ref 5–17)
APPEARANCE UR: CLEAR — SIGNIFICANT CHANGE UP
APTT BLD: 30.7 SEC — SIGNIFICANT CHANGE UP (ref 27.5–37.4)
AST SERPL-CCNC: 17 U/L — SIGNIFICANT CHANGE UP (ref 10–40)
BACTERIA # UR AUTO: ABNORMAL
BASOPHILS # BLD AUTO: 0 K/UL — SIGNIFICANT CHANGE UP (ref 0–0.2)
BASOPHILS NFR BLD AUTO: 0.2 % — SIGNIFICANT CHANGE UP (ref 0–2)
BILIRUB SERPL-MCNC: 0.4 MG/DL — SIGNIFICANT CHANGE UP (ref 0.2–1.2)
BILIRUB UR-MCNC: NEGATIVE — SIGNIFICANT CHANGE UP
BUN SERPL-MCNC: 13 MG/DL — SIGNIFICANT CHANGE UP (ref 7–23)
CALCIUM SERPL-MCNC: 9.5 MG/DL — SIGNIFICANT CHANGE UP (ref 8.4–10.5)
CHLORIDE SERPL-SCNC: 105 MMOL/L — SIGNIFICANT CHANGE UP (ref 96–108)
CO2 SERPL-SCNC: 21 MMOL/L — LOW (ref 22–31)
COLOR SPEC: COLORLESS — SIGNIFICANT CHANGE UP
CREAT SERPL-MCNC: 0.76 MG/DL — SIGNIFICANT CHANGE UP (ref 0.5–1.3)
DIFF PNL FLD: NEGATIVE — SIGNIFICANT CHANGE UP
EOSINOPHIL # BLD AUTO: 0.5 K/UL — SIGNIFICANT CHANGE UP (ref 0–0.5)
EOSINOPHIL NFR BLD AUTO: 4.3 % — SIGNIFICANT CHANGE UP (ref 0–6)
EPI CELLS # UR: 3 /HPF — SIGNIFICANT CHANGE UP
GLUCOSE SERPL-MCNC: 129 MG/DL — HIGH (ref 70–99)
GLUCOSE UR QL: NEGATIVE — SIGNIFICANT CHANGE UP
HCT VFR BLD CALC: 45 % — SIGNIFICANT CHANGE UP (ref 34.5–45)
HGB BLD-MCNC: 16.1 G/DL — HIGH (ref 11.5–15.5)
HYALINE CASTS # UR AUTO: 1 /LPF — SIGNIFICANT CHANGE UP (ref 0–2)
INR BLD: 1.08 RATIO — SIGNIFICANT CHANGE UP (ref 0.88–1.16)
KETONES UR-MCNC: NEGATIVE — SIGNIFICANT CHANGE UP
LEUKOCYTE ESTERASE UR-ACNC: NEGATIVE — SIGNIFICANT CHANGE UP
LYMPHOCYTES # BLD AUTO: 3.6 K/UL — HIGH (ref 1–3.3)
LYMPHOCYTES # BLD AUTO: 33.8 % — SIGNIFICANT CHANGE UP (ref 13–44)
MCHC RBC-ENTMCNC: 28 PG — SIGNIFICANT CHANGE UP (ref 27–34)
MCHC RBC-ENTMCNC: 35.8 GM/DL — SIGNIFICANT CHANGE UP (ref 32–36)
MCV RBC AUTO: 78.2 FL — LOW (ref 80–100)
MONOCYTES # BLD AUTO: 0.6 K/UL — SIGNIFICANT CHANGE UP (ref 0–0.9)
MONOCYTES NFR BLD AUTO: 5.4 % — SIGNIFICANT CHANGE UP (ref 2–14)
NEUTROPHILS # BLD AUTO: 6 K/UL — SIGNIFICANT CHANGE UP (ref 1.8–7.4)
NEUTROPHILS NFR BLD AUTO: 56.4 % — SIGNIFICANT CHANGE UP (ref 43–77)
NITRITE UR-MCNC: NEGATIVE — SIGNIFICANT CHANGE UP
PH UR: 6.5 — SIGNIFICANT CHANGE UP (ref 5–8)
PLATELET # BLD AUTO: 293 K/UL — SIGNIFICANT CHANGE UP (ref 150–400)
POTASSIUM SERPL-MCNC: 3.6 MMOL/L — SIGNIFICANT CHANGE UP (ref 3.5–5.3)
POTASSIUM SERPL-SCNC: 3.6 MMOL/L — SIGNIFICANT CHANGE UP (ref 3.5–5.3)
PROT SERPL-MCNC: 7.3 G/DL — SIGNIFICANT CHANGE UP (ref 6–8.3)
PROT UR-MCNC: NEGATIVE — SIGNIFICANT CHANGE UP
PROTHROM AB SERPL-ACNC: 11.7 SEC — SIGNIFICANT CHANGE UP (ref 9.8–12.7)
RBC # BLD: 5.76 M/UL — HIGH (ref 3.8–5.2)
RBC # FLD: 12.4 % — SIGNIFICANT CHANGE UP (ref 10.3–14.5)
RBC CASTS # UR COMP ASSIST: 1 /HPF — SIGNIFICANT CHANGE UP (ref 0–4)
SODIUM SERPL-SCNC: 138 MMOL/L — SIGNIFICANT CHANGE UP (ref 135–145)
SP GR SPEC: 1.01 — SIGNIFICANT CHANGE UP (ref 1.01–1.02)
TROPONIN T, HIGH SENSITIVITY RESULT: <6 NG/L — SIGNIFICANT CHANGE UP (ref 0–51)
UROBILINOGEN FLD QL: NEGATIVE — SIGNIFICANT CHANGE UP
WBC # BLD: 10.7 K/UL — HIGH (ref 3.8–10.5)
WBC # FLD AUTO: 10.7 K/UL — HIGH (ref 3.8–10.5)
WBC UR QL: 4 /HPF — SIGNIFICANT CHANGE UP (ref 0–5)

## 2018-10-23 PROCEDURE — 70496 CT ANGIOGRAPHY HEAD: CPT | Mod: 26

## 2018-10-23 PROCEDURE — 93010 ELECTROCARDIOGRAM REPORT: CPT

## 2018-10-23 PROCEDURE — 70450 CT HEAD/BRAIN W/O DYE: CPT | Mod: 26,59

## 2018-10-23 PROCEDURE — 99284 EMERGENCY DEPT VISIT MOD MDM: CPT | Mod: 25

## 2018-10-23 PROCEDURE — 70498 CT ANGIOGRAPHY NECK: CPT | Mod: 26

## 2018-10-23 RX ORDER — METOCLOPRAMIDE HCL 10 MG
10 TABLET ORAL ONCE
Qty: 0 | Refills: 0 | Status: COMPLETED | OUTPATIENT
Start: 2018-10-23 | End: 2018-10-23

## 2018-10-23 RX ORDER — DIPHENHYDRAMINE HCL 50 MG
25 CAPSULE ORAL ONCE
Qty: 0 | Refills: 0 | Status: COMPLETED | OUTPATIENT
Start: 2018-10-23 | End: 2018-10-23

## 2018-10-23 RX ORDER — SODIUM CHLORIDE 9 MG/ML
1000 INJECTION INTRAMUSCULAR; INTRAVENOUS; SUBCUTANEOUS ONCE
Qty: 0 | Refills: 0 | Status: COMPLETED | OUTPATIENT
Start: 2018-10-23 | End: 2018-10-23

## 2018-10-23 RX ORDER — ACETAMINOPHEN 500 MG
1000 TABLET ORAL ONCE
Qty: 0 | Refills: 0 | Status: COMPLETED | OUTPATIENT
Start: 2018-10-23 | End: 2018-10-23

## 2018-10-23 RX ORDER — KETOROLAC TROMETHAMINE 30 MG/ML
15 SYRINGE (ML) INJECTION ONCE
Qty: 0 | Refills: 0 | Status: DISCONTINUED | OUTPATIENT
Start: 2018-10-23 | End: 2018-10-23

## 2018-10-23 RX ADMIN — Medication 400 MILLIGRAM(S): at 22:05

## 2018-10-23 RX ADMIN — Medication 25 MILLIGRAM(S): at 22:04

## 2018-10-23 RX ADMIN — Medication 25 MILLIGRAM(S): at 22:03

## 2018-10-23 RX ADMIN — Medication 10 MILLIGRAM(S): at 22:04

## 2018-10-23 RX ADMIN — SODIUM CHLORIDE 1000 MILLILITER(S): 9 INJECTION INTRAMUSCULAR; INTRAVENOUS; SUBCUTANEOUS at 21:51

## 2018-10-23 NOTE — ED PROVIDER NOTE - OBJECTIVE STATEMENT
23 y.o female history of migraines which have had occasion to be complex in the past coming in with a headache that started around 2-3 hours ago.  Pt took an Excedrin without relief.  About an hour ago she experienced difficulty speaking and left sided weakness.  Came into the triage with her boyfriend and was evaluated, code stroke called.  Pt currently having difficult speaking so unable to provide a full history.

## 2018-10-23 NOTE — ED PROVIDER NOTE - CONSTITUTIONAL, MLM
normal... appears nervous, WN WD female in a wheelchair with difficulty speaking, following my commands to the best of her ability

## 2018-10-23 NOTE — ED PROVIDER NOTE - ATTENDING CONTRIBUTION TO CARE
attending Adelso: code stroke call on arrival  23yF h/o complex migraines p/w headache x 3 hours and 1 hour L sided weakness and difficulty speaking. As per family at bedside, pt with similar symptoms with previous complex migraines. Will obtain STAT CTH, neuro consult, labs, and reassess

## 2018-10-23 NOTE — ED ADULT NURSE NOTE - NSIMPLEMENTINTERV_GEN_ALL_ED
Implemented All Fall Risk Interventions:  [place] to call system. Call bell, personal items and telephone within reach. Instruct patient to call for assistance. Room bathroom lighting operational. Non-slip footwear when patient is off stretcher. Physically safe environment: no spills, clutter or unnecessary equipment. Stretcher in lowest position, wheels locked, appropriate side rails in place. Provide visual cue, wrist band, yellow gown, etc. Monitor gait and stability. Monitor for mental status changes and reorient to person, place, and time. Review medications for side effects contributing to fall risk. Reinforce activity limits and safety measures with patient and family.

## 2018-10-23 NOTE — CONSULT NOTE ADULT - ASSESSMENT
23 year old  RH female with history of migraines (some complex in the past, associated with LUE and LLE weakness, got tPa in the past) who presents with headache that began in the morning of presentation, worsened at around 6:30 PM, and about an hour prior to presentation she developed difficulty speaking and weakness in her LUE and LLE. She denies recent trauma, illnesses, med changes.   NIHSS 9 (LUE and LLE weakness, dysmetria drift, severe aphasia), preMRS 0     After CT head was done, patient was re-evaluated fully. Her speech had improved significantly, she was speaking in full sentences, and LUE and LLE strength had grossly improved (4-/5 in both). Her headache was resolving also, no new additional deficits  NIHSS 2 (LUE and LLE drift - able to hold up extremities for 10 and 5 sec, respectively). preMRS 0.  After discussing risks and benefits of tPa in full to the patient, including that her symptoms were improving so the risks would likely outweigh the benefits and that she had received tPa in the past which might increase risk of bleed, patient understood and refused tPa.   Case d/w Mirna, stroke fellow.     findings as above, weakness improved significantly approximately 20 - 30 minutes after arrival to ER  Etiology: complex hemiplegic migraine    Plan  [] CTA head and neck  [] IVF, reglan, benadryl  [] frequent neuro checks  [] if imaging is unremarkable may follow up with vascular as outpatient PRN    D/w Stroke fellow, who also discussed case with Dr Carvajal.

## 2018-10-23 NOTE — ED ADULT NURSE NOTE - OBJECTIVE STATEMENT
Pt presents to the ED with complaint of left sided weakness, awake and alert, boyfriend at bedside, difficulty speaking on arrival, PERRL, Pt reports that 30 minutes prior to arrival at ED she experienced a severe headache, pt began to feel weakness to left side with trouble speaking, drift to left leg and arm, wekness to Left side, no facial asymmetry noted, equal sensation intact to upper and lower extremities, reports "difficulty breathing", breathing tachypneic, symmetrical, unlabored, skins color normal for age and race, pt has history of symptoms of similar nature, pt self reports anxiety at this time, received tpa in the past, symptoms imroved after CT scan, pt able to verbalize needs, AXOX3, no chest pain at this time. No nausea vomiting or diarrhea, no fevers or chills

## 2018-10-23 NOTE — CONSULT NOTE ADULT - SUBJECTIVE AND OBJECTIVE BOX
HPI:  Patient is a 23 year old  RH female with history of migraines (some complex in the past, associated with LUE and LLE weakness, got tPa in the past) who presents with headache that began in the morning of presentation, worsened at around 6:30 PM, and about an hour prior to presentation she developed difficulty speaking and weakness in her LUE and LLE. She denies recent trauma, illnesses, med changes.   NIHSS 9 (LUE and LLE weakness, dysmetria drift, severe aphasia), preMRS 0   After CT head was done, patient was re-evaluated fully. Her speech had improved significantly, she was speaking in full sentences, and LUE and LLE strength had grossly improved (4-/5 in both). Her headache was resolving also, no new additional deficits  NIHSS 2 (LUE and LLE drift - able to hold up extremities for 10 and 5 sec, respectively). preMRS 0.  After discussing risks and benefits of tPa in full to the patient, including that her symptoms were improving so the risks would likely outweigh the benefits and that she had received tPa in the past which might increase risk of bleed, patient understood and refused tPa.   Case d/w Mirna, stroke fellow.     MEDICATIONS  (STANDING):  metoclopramide Injectable 10 milliGRAM(s) IV Push once    PAST MEDICAL & SURGICAL HISTORY:  Scoliosis  Chronic cholecystitis  Asthma  History of cholecystectomy: June 2016    FAMILY HISTORY:  No pertinent family history in first degree relatives    Allergies  carrots (Anaphylaxis)  fish (Rash; Vomiting)  latex (Rash)  rasberries (Rash; Swelling)  Seafood (Hives)  walnut (Hives; Anaphylaxis)  Zofran (Hives)    SHx - No smoking, No ETOH, No drug abuse    Review of Systems:  CONSTITUTIONAL:  No weight loss, fever, chills, weakness or fatigue.  HEENT:  Eyes:  No visual loss, blurred vision, double vision or yellow sclerae. Ears, Nose, Throat:  No hearing loss, sneezing, congestion, runny nose or sore throat.  CARDIOVASCULAR:  No chest pain, chest pressure or chest discomfort. No palpitations or edema.  GASTROINTESTINAL:  No anorexia, nausea, vomiting or diarrhea. No abdominal pain or blood.  NEUROLOGICAL: See HPI  MUSCULOSKELETAL:  No muscle, back pain, joint pain or stiffness.  PSYCHIATRIC:  No history of depression or anxiety.    Vital Signs Last 24 Hrs  T(C): 37.5 (23 Oct 2018 21:08), Max: 37.5 (23 Oct 2018 21:08)  T(F): 99.5 (23 Oct 2018 21:08), Max: 99.5 (23 Oct 2018 21:08)  HR: 110 (23 Oct 2018 21:08) (110 - 110)  BP: 133/89 (23 Oct 2018 21:08) (133/89 - 133/89)  BP(mean): --  RR: 18 (23 Oct 2018 21:08) (18 - 18)  SpO2: 95% (23 Oct 2018 21:08) (95% - 95%)    *Based on initial presentation when NIHSS was 9.  General Exam:   General appearance: No acute distress                 Neurological Exam:  Mental Status: Orientated to self, date and place.    Severe dysarthria and aphasia.       Cranial Nerves: CN I - not tested.  PERRL, EOMI, VFF, no nystagmus or diplopia.  No APD.    Fundi not visualized bilaterally.    No facial asymmetry.  Hearing intact to finger rub bilaterally.     Motor:   Tone: normal.                  Strength:     [] Upper extremity                      Delt       Bicep    Tricep                                                  R         5/5        5/5        5/5       5/5                                               L          2/5        2/5        2/5       2/5  [] Lower extremity                       HF          KE          KF        DF         PF                                               R        5/5        5/5        5/5       5/5       5/5                                               L         2/5        2/5       2/5       1/5        1/5  Pronator drift: + drift in LUE and LLE                Dysmetria: unable to do w LUE, intact with RUE  No truncal ataxia.    Tremor: No resting, postural or action tremor.  No myoclonus.    Sensation: intact to light touch,    Deep Tendon Reflexes:   Right 2+ : BC, TC, BRD   Left 2+ : BC, TC, BRD  Right 2+ Knee, 1+ ankle  Left 2+ Knee, 1+ ankle    Toes flexor bilaterally  Gait: deferred, patient had significant weakness in LLE.      Other:  Radiology  CT head: negative

## 2018-10-23 NOTE — ED PROVIDER NOTE - PROGRESS NOTE DETAILS
Pt follows up an Donavon Cleary, neurology resident aware of history of complex migraines.  Kermit attending Adelso: pt reports moderately improved headache and L sided weakness. Headache now 4/10 (previously 10). Pt and family updated on CT results. Will administer Toradol for persistent mild HA. attending Adelso: pt now with resolved headache. Ambulating independently. Will dc with close outpatient follow-up and strict return precautions.

## 2018-10-24 VITALS
DIASTOLIC BLOOD PRESSURE: 69 MMHG | TEMPERATURE: 98 F | RESPIRATION RATE: 18 BRPM | HEART RATE: 69 BPM | OXYGEN SATURATION: 96 % | SYSTOLIC BLOOD PRESSURE: 103 MMHG

## 2018-10-24 PROCEDURE — 82962 GLUCOSE BLOOD TEST: CPT

## 2018-10-24 PROCEDURE — 70496 CT ANGIOGRAPHY HEAD: CPT

## 2018-10-24 PROCEDURE — 85027 COMPLETE CBC AUTOMATED: CPT

## 2018-10-24 PROCEDURE — 85730 THROMBOPLASTIN TIME PARTIAL: CPT

## 2018-10-24 PROCEDURE — 84484 ASSAY OF TROPONIN QUANT: CPT

## 2018-10-24 PROCEDURE — 81001 URINALYSIS AUTO W/SCOPE: CPT

## 2018-10-24 PROCEDURE — 96376 TX/PRO/DX INJ SAME DRUG ADON: CPT | Mod: XU

## 2018-10-24 PROCEDURE — 96374 THER/PROPH/DIAG INJ IV PUSH: CPT | Mod: XU

## 2018-10-24 PROCEDURE — 96375 TX/PRO/DX INJ NEW DRUG ADDON: CPT | Mod: XU

## 2018-10-24 PROCEDURE — 80053 COMPREHEN METABOLIC PANEL: CPT

## 2018-10-24 PROCEDURE — 85610 PROTHROMBIN TIME: CPT

## 2018-10-24 PROCEDURE — 99284 EMERGENCY DEPT VISIT MOD MDM: CPT | Mod: 25

## 2018-10-24 PROCEDURE — 93005 ELECTROCARDIOGRAM TRACING: CPT

## 2018-10-24 PROCEDURE — 70450 CT HEAD/BRAIN W/O DYE: CPT

## 2018-10-24 PROCEDURE — 70498 CT ANGIOGRAPHY NECK: CPT

## 2018-10-24 RX ADMIN — Medication 15 MILLIGRAM(S): at 00:03

## 2018-10-25 ENCOUNTER — APPOINTMENT (OUTPATIENT)
Dept: NEUROLOGY | Facility: CLINIC | Age: 23
End: 2018-10-25
Payer: MEDICAID

## 2018-10-25 VITALS
SYSTOLIC BLOOD PRESSURE: 100 MMHG | HEIGHT: 63 IN | BODY MASS INDEX: 25.69 KG/M2 | HEART RATE: 55 BPM | WEIGHT: 145 LBS | DIASTOLIC BLOOD PRESSURE: 66 MMHG

## 2018-10-25 DIAGNOSIS — R51 HEADACHE: ICD-10-CM

## 2018-10-25 PROCEDURE — 99214 OFFICE O/P EST MOD 30 MIN: CPT

## 2018-10-25 RX ORDER — NAPROXEN 500 MG/1
500 TABLET ORAL
Qty: 60 | Refills: 0 | Status: ACTIVE | COMMUNITY
Start: 2018-10-25 | End: 1900-01-01

## 2018-12-06 ENCOUNTER — APPOINTMENT (OUTPATIENT)
Dept: MRI IMAGING | Facility: CLINIC | Age: 23
End: 2018-12-06

## 2019-01-16 ENCOUNTER — APPOINTMENT (OUTPATIENT)
Dept: NEUROLOGY | Facility: CLINIC | Age: 24
End: 2019-01-16

## 2019-01-28 ENCOUNTER — APPOINTMENT (OUTPATIENT)
Dept: NEUROLOGY | Facility: CLINIC | Age: 24
End: 2019-01-28

## 2019-11-12 NOTE — OCCUPATIONAL THERAPY INITIAL EVALUATION ADULT - BATHING, PREVIOUS LEVEL OF FUNCTION, OT EVAL
Detail Level: Detailed Body Location Override (Optional - Billing Will Still Be Based On Selected Body Map Location If Applicable): rt posterior neck independent

## 2020-04-01 ENCOUNTER — APPOINTMENT (OUTPATIENT)
Dept: NEUROLOGY | Facility: CLINIC | Age: 25
End: 2020-04-01
Payer: COMMERCIAL

## 2020-04-01 DIAGNOSIS — G43.009 MIGRAINE W/OUT AURA, NOT INTRACTABLE, W/OUT STATUS MIGRAINOSUS: ICD-10-CM

## 2020-04-01 PROCEDURE — 99213 OFFICE O/P EST LOW 20 MIN: CPT

## 2020-04-01 NOTE — PHYSICAL EXAM
[General Appearance - Alert] : alert [Oriented To Time, Place, And Person] : oriented to person, place, and time [Person] : oriented to person [Place] : oriented to place [Time] : oriented to time [Cranial Nerves Oculomotor (III)] : extraocular motion intact [Cranial Nerves Facial (VII)] : face symmetrical [Cranial Nerves Vestibulocochlear (VIII)] : hearing was intact bilaterally [Cranial Nerves Accessory (XI - Cranial And Spinal)] : head turning and shoulder shrug symmetric [Cranial Nerves Hypoglossal (XII)] : there was no tongue deviation with protrusion [Motor Strength] : muscle strength was normal in all four extremities [Abnormal Walk] : normal gait [Coordination - Dysmetria Impaired Finger-to-Nose Bilateral] : not present [FreeTextEntry6] : was walking her dog

## 2020-04-01 NOTE — DISCUSSION/SUMMARY
[FreeTextEntry1] : 24 W who is here for followup of her complicated migraines. Will try amitriptyline 25mg at night. SE discussed with patient in detail. She will call in a week if migraines remain at the same frequency and duration.\par \par \par physician location: home\par patient location: home\par \par I spent 16 minutes of face to face time, during which more than 50% of the time was spent on counseling. I explained the diagnosis, other possible diagnoses, workup, and management, as well as answered any questions.\par \par This is a telemedicine visit that was performed with Patricia Soliman. Verbal consent to participate in video visit was obtained and patient was aware of their right to refuse to participate in services delivered via telemedicine and the alternative and potential limitations of participating in a telemedicine visit vs a face-to-face visit; I have also informed the patient of my current location in the [facility] and the names of all persons participating in the telemedicine service and their role in the encounter. The patient agrees to have this service via Telehealth.\par \par  This visit occurred during the Coronavirus (COVID-19) Public Health Emergency. I discussed with the patient the nature of our telemedicine visits, that: \par • I would evaluate the patient and recommend diagnostics and treatments based on my assessment \par • Our sessions are not being recorded and that personal health information is protected \par • Our team would provide follow up care in person if/when the patient needs it.\par

## 2020-04-01 NOTE — HISTORY OF PRESENT ILLNESS
[FreeTextEntry1] : verbal consent given on 04/01/2020 and 13:40  by CARSON MOHAN at [address]\[place] [address]\par \par She states her migraines used to last a few min but now it lasts a few hours. It alternates sides. She has tried sumatriptan with another neurologist since our last visit. She states sumatriptan does not work. She states this is similar to her previous migraines after her head injury. \par \par CONSENT FOR VIDEO MEDICAL VISIT1. I understand that my physician wishes me to engage in a telemedicine consultation.2. My physician has explained to me how the video conferencing technology will be used to affect such a consultation will not be the same as a direct patient/health care provider visit due to the fact that I will not be in the same room as my physician 3. I understand there are potential risks to this technology, including interruptions, unauthorized access and technical difficulties. I understand that my health care provider or I can discontinue the telemedicine consult/visit if it is felt that the videoconferencing connections are not adequate for the situation.4. I understand that my healthcare information may be shared with other individuals for scheduling and billing purposes. Others may also be present during the consultation other than my physician and consulting health care provider in order to operate the video equipment. The above mentioned people will all maintain confidentiality of the information obtained. I further understand that I will be informed of their presence in the consultation and thus will have the right to request the following: (1) omit specific details of my medical history/physical examination that are personally sensitive to me; (2) ask non-medical personnel to leave the telemedicine examination room: and or (3) terminate the consultation at any time.5. I have had the alternatives to a telemedicine consultation explained to me, and in choosing to participate in a telemedicine consultation. I understand that some parts of the exam involving physical tests may be conducted by individuals at my location at the direction of the consulting health care provider.6. In an emergent consultation, I understand that the responsibility of the telemedicine consulting specialist is to advise my local practitioner and that the specialist’s responsibility will conclude upon the termination of the video conference connection.7. I understand that billing will occur from both my physician and as a facility fee from the site from which I am presented.8. I have had a direct conversation with my physician, during which I had the opportunity to ask questions in regard to this procedure. My questions have been answered and the risks, benefits and any practical alternatives have been discussed with me in a language in which I understand\par

## 2020-11-20 NOTE — DISCHARGE NOTE ADULT - NS AS DC STROKE ED MATERIALS
Prescribed Medications/Stroke Education Booklet/Need for Followup After Discharge/Risk Factors for Stroke/Stroke Warning Signs and Symptoms/Call 911 for Stroke Prescribed Medications/Stroke Education Booklet/Need for Followup After Discharge/Risk Factors for Stroke/Call 911 for Stroke/Stroke Warning Signs and Symptoms unknown

## 2021-03-25 NOTE — ED ADULT NURSE NOTE - [facility] ED NUR SYMPTOMS NEG
Actinic Keratosis:  Patient advised lesions are precancers  These should resolve with cryosurgery if not to let us know sun block recommended  Patient to return follow-up in August for overall checkup  Treatment with Cryotherapy    The doctor has treated your skin with nitrogen, which is 320 degrees Fahrenheit below zero  He has given the treated area "frostbite "    Stinging should subside within a few hours  You can take Tylenol for pain, if needed  Over the next few days, it is normal if the area becomes reddened, a blood blister, or swollen with fluid  If the lesion treated was near the eye - you could get a swollen eye over the next few days  Do not panic! This is all temporary, and will resolve with time  There is no special treatment - just keep the area clean  Makeup and BandAids can be used, if preferred  When the area starts to dry up and peel off, using Vaseline can help healing  It usually takes up to a month for it to heal   Some lesions are recurrent and may require repeat treatments  If a lesion has not healed in one month, please don't hesitate to contact us  If you have any further questions that are not answered here, please call us  [phone]    Thank you for allowing us to care for you 
no numbness/no change in level of consciousness/no confusion/no loss of consciousness/no nausea/no fever

## 2021-04-02 ENCOUNTER — APPOINTMENT (OUTPATIENT)
Dept: OBGYN | Facility: CLINIC | Age: 26
End: 2021-04-02

## 2021-09-01 ENCOUNTER — RESULT REVIEW (OUTPATIENT)
Age: 26
End: 2021-09-01

## 2022-06-09 ENCOUNTER — APPOINTMENT (OUTPATIENT)
Dept: OBGYN | Facility: CLINIC | Age: 27
End: 2022-06-09

## 2022-07-20 NOTE — ED PROVIDER NOTE - PRINCIPAL DIAGNOSIS
Detail Level: Simple
Detail Level: Detailed
Size Of Lesion: 2mm
Detail Level: Generalized
Detail Level: Zone
CVA (cerebral vascular accident)

## 2022-12-21 NOTE — PATIENT PROFILE ADULT. - PROVIDER NOTIFICATION NAME
[Normal Coordination] : normal coordination [Normal DTR UE/LE] : normal DTR UE/LE  [Normal Sensation] : normal sensation [Normal Mood and Affect] : normal mood and affect [Orientated] : orientated [Normal Skin] : normal skin [5___] : right hamstring 5[unfilled]/5 [4___] : right extensor hallicus longus 4[unfilled]/5 [de-identified] : Constitutional:\par -  General Appearance: \par Unremarkable\par Body Habitus\par Well Developed \par Well Nourished\par Body Habitus\par No Deformities\par Well Groomed\par \par Ability To communicate:\par Normal\par \par Neurologic: \par Global sensation is intact to upper and lower extremities.  See examination of Neck and/or Spine for exceptions.\par Orientation to Time, Place and Person is: Normal\par Mood And Affect is Normal\par \par Skin:\par -  Head/Face, Right Upper/Lower Extremity, Left Upper/Lower Extremity: Normal\par See Examination of Neck and/or Spine for exceptions\par \par Cardiovascular:\par Peripheral Cardiovascular System is Normal\par \par Palpation of Lymph Nodes:\par Normal Palpation of lymph nodes in: Axilla, Cervical, Inguinal\par Abnormal Palpation of lymph nodes in: None  [] : non-antalgic [FreeTextEntry3] : healed incision at L5-S1 [FreeTextEntry8] : greater trochanter tenderness on the right [de-identified] : L4-5 right sided paraesthesia  [de-identified] : Right L4-L5 paresthesias  [TWNoteComboBox7] : forward flexion 45 degrees [de-identified] : extension 5 degrees Dr Ayde Lopez